# Patient Record
Sex: FEMALE | Race: WHITE | NOT HISPANIC OR LATINO | Employment: OTHER | URBAN - METROPOLITAN AREA
[De-identification: names, ages, dates, MRNs, and addresses within clinical notes are randomized per-mention and may not be internally consistent; named-entity substitution may affect disease eponyms.]

---

## 2017-10-09 ENCOUNTER — OFFICE VISIT (OUTPATIENT)
Dept: INTERNAL MEDICINE | Facility: CLINIC | Age: 42
End: 2017-10-09

## 2017-10-09 VITALS
OXYGEN SATURATION: 99 % | WEIGHT: 111.5 LBS | BODY MASS INDEX: 17.5 KG/M2 | HEIGHT: 67 IN | HEART RATE: 83 BPM | SYSTOLIC BLOOD PRESSURE: 110 MMHG | DIASTOLIC BLOOD PRESSURE: 78 MMHG

## 2017-10-09 DIAGNOSIS — Z30.41 ENCOUNTER FOR BIRTH CONTROL PILLS MAINTENANCE: Primary | ICD-10-CM

## 2017-10-09 PROCEDURE — 99202 OFFICE O/P NEW SF 15 MIN: CPT | Performed by: NURSE PRACTITIONER

## 2017-10-09 RX ORDER — NORGESTIMATE AND ETHINYL ESTRADIOL 0.25-0.035
1 KIT ORAL DAILY
Qty: 28 TABLET | Refills: 11 | Status: SHIPPED | OUTPATIENT
Start: 2017-10-09 | End: 2018-10-12 | Stop reason: SDUPTHER

## 2017-10-09 RX ORDER — NORGESTIMATE AND ETHINYL ESTRADIOL 0.25-0.035
1 KIT ORAL DAILY
COMMUNITY
End: 2017-10-09 | Stop reason: SDUPTHER

## 2017-10-09 NOTE — PROGRESS NOTES
"Chief Complaint   Patient presents with   • Establish Care       Margot Lamar is a 42 y.o. female presenting for refill on birth control pill; She was previously seen at [facility], but since her insurance changed, they no longer accept her insurance.  She needs to establish with PCP.  She had a Pap last year, which was normal; no abnormal Pap in past; no chronic medical problems; she states she has regular periods that last 5-6 days of moderate bleeding and denies pain with menses.  She has 2 children and has always remained on birth control between pregnancies; she has never had any issues taking OCPs, denies shortness of breath; pain in legs, chest.    No family hx of cancer    PFMSH    No past medical history on file.    No past surgical history on file.    Family History   Problem Relation Age of Onset   • Diabetes Sister    • Heart disease Maternal Grandfather        Social History     Social History   • Marital status: Unknown     Spouse name: N/A   • Number of children: N/A   • Years of education: N/A     Occupational History   • Not on file.     Social History Main Topics   • Smoking status: Never Smoker   • Smokeless tobacco: Not on file   • Alcohol use Yes      Comment: 2 drinks per month   • Drug use: No   • Sexual activity: Not on file     Other Topics Concern   • Not on file     Social History Narrative   • No narrative on file           Current Outpatient Prescriptions:   •  norgestimate-ethinyl estradiol (MONO-LINYAH) 0.25-35 MG-MCG per tablet, Take 1 tablet by mouth Daily., Disp: 28 tablet, Rfl: 11    Review of Systems   Constitutional: Negative for activity change, appetite change and fatigue.   Respiratory: Negative for shortness of breath.    Cardiovascular: Negative for leg swelling.   Neurological: Negative for headaches.   All other systems reviewed and are negative.      /78  Pulse 83  Ht 66.5\" (168.9 cm)  Wt 111 lb 8 oz (50.6 kg)  SpO2 99%  BMI 17.73 kg/m2    Physical " Exam   Constitutional: She is oriented to person, place, and time. She appears well-developed and well-nourished.   HENT:   Head: Normocephalic and atraumatic.   Eyes: Conjunctivae are normal.   Neck: Trachea normal and normal range of motion. Neck supple. No JVD present. No thyromegaly present.   Cardiovascular: Normal rate, regular rhythm, normal heart sounds and intact distal pulses.    No murmur heard.  Pulmonary/Chest: Effort normal and breath sounds normal.   Abdominal: Soft. Normal appearance and bowel sounds are normal. There is no tenderness.   Neurological: She is alert and oriented to person, place, and time.   Skin: Skin is warm, dry and intact.   Psychiatric: She has a normal mood and affect. Her speech is normal and behavior is normal.   Vitals reviewed.      ASSESSMENT/PLAN    1. Encounter for birth control pills maintenance  -Refill for birth control  - norgestimate-ethinyl estradiol (MONO-LINYAH) 0.25-35 MG-MCG per tablet; Take 1 tablet by mouth Daily.  Dispense: 28 tablet; Refill: 11  -needs Pap in 2019    F/U 1 year for physical exam with Pap    Plan of care reviewed with patient at the conclusion of today's visit. Education was provided in regards to diagnosis, management and any prescribed or recommended OTC medications.  Patient verbalizes Understanding of and agreement with management plan.        LUCINDA Pearson

## 2018-03-21 ENCOUNTER — OFFICE VISIT (OUTPATIENT)
Dept: INTERNAL MEDICINE | Facility: CLINIC | Age: 43
End: 2018-03-21

## 2018-03-21 VITALS
HEIGHT: 66 IN | OXYGEN SATURATION: 99 % | SYSTOLIC BLOOD PRESSURE: 118 MMHG | HEART RATE: 88 BPM | TEMPERATURE: 97.5 F | DIASTOLIC BLOOD PRESSURE: 64 MMHG | WEIGHT: 115 LBS | BODY MASS INDEX: 18.48 KG/M2

## 2018-03-21 DIAGNOSIS — R07.81 RIB PAIN ON LEFT SIDE: Primary | ICD-10-CM

## 2018-03-21 DIAGNOSIS — J22 LOWER RESPIRATORY INFECTION (E.G., BRONCHITIS, PNEUMONIA, PNEUMONITIS, PULMONITIS): ICD-10-CM

## 2018-03-21 PROCEDURE — 99214 OFFICE O/P EST MOD 30 MIN: CPT | Performed by: NURSE PRACTITIONER

## 2018-03-21 RX ORDER — PREDNISONE 20 MG/1
20 TABLET ORAL DAILY
Qty: 5 TABLET | Refills: 0 | Status: SHIPPED | OUTPATIENT
Start: 2018-03-21 | End: 2018-03-26

## 2018-03-21 RX ORDER — AZITHROMYCIN 250 MG/1
TABLET, FILM COATED ORAL
Qty: 6 TABLET | Refills: 0 | Status: SHIPPED | OUTPATIENT
Start: 2018-03-21 | End: 2018-10-12

## 2018-03-21 NOTE — PROGRESS NOTES
"  Chief Complaint   Patient presents with   • URI     Cough; muscle pain in stomach from coughing so much. Sx stared a few days ago.        HPI  Margot Lamar is a 42 y.o. female who presents with continuous persistent coughing from the resolved flu with fever and chest congestion last week. Sever  Cough to trigger pain in her left lower rib a few days ago. She is unable to drive, has decreased movemnt and ambulation due to pain.  Productive cough is uncharged in severity. OTC cough suppressants helping. No shortness of breath, more discomfort and pain that keeps her from moving. -history of cardiopulmonary processes, -calf pain, tenderness or swelling.  HPI     [facility]  The following portions of the patient's history were reviewed and updated as appropriate: allergies, current medications, past family history, past medical history, past social history, past surgical history and problem list.      Review of Systems   Constitutional: Positive for activity change. Negative for diaphoresis.   Respiratory: Positive for cough, chest tightness and shortness of breath.    Cardiovascular: Negative for chest pain, palpitations and leg swelling.   Gastrointestinal: Negative for nausea.   Neurological: Negative for dizziness, syncope and headaches.   Hematological: Negative for adenopathy.   Psychiatric/Behavioral: Positive for decreased concentration.   All other systems reviewed and are negative.          Objective   Blood pressure 118/64, pulse 88, temperature 97.5 °F (36.4 °C), temperature source Oral, height 167.6 cm (66\"), weight 52.2 kg (115 lb), SpO2 99 %.  Body mass index is 18.56 kg/m².      Physical Exam   Constitutional: She is oriented to person, place, and time. She appears well-developed and well-nourished. No distress.   HENT:   Head: Normocephalic and atraumatic.   Right Ear: Hearing and external ear normal.   Left Ear: Hearing and external ear normal.   Nose: Nose normal.   Mouth/Throat: Uvula is midline, " oropharynx is clear and moist and mucous membranes are normal.   Eyes: Conjunctivae and EOM are normal. Pupils are equal, round, and reactive to light.   Neck: Trachea normal and normal range of motion. Neck supple. No JVD present.   Without crepitus, pain, or contractures noted.   Cardiovascular: Normal rate, regular rhythm, S1 normal, S2 normal and normal heart sounds.    No murmur heard.  Pulmonary/Chest: Effort normal. No respiratory distress. She has no decreased breath sounds. She has rhonchi in the right upper field, the right middle field, the right lower field, the left upper field, the left middle field and the left lower field. She exhibits tenderness and bony tenderness. She exhibits no mass, no crepitus, no edema, no deformity and no retraction.       Patient sounds slightly winded when talking in sentences.    Abdominal: There is no hepatosplenomegaly.   Lymphadenopathy:        Head (left side): No occipital adenopathy present.     She has no cervical adenopathy.        Right: No supraclavicular adenopathy present.        Left: No supraclavicular adenopathy present.   Neurological: She is alert and oriented to person, place, and time.   Skin: Skin is warm and dry. Capillary refill takes 2 to 3 seconds. No cyanosis. No pallor.   Psychiatric: She has a normal mood and affect.   Nursing note and vitals reviewed.            ASSESSMENT/PLAN  Margot was seen today for uri.    Diagnoses and all orders for this visit:    Rib pain on left side  -     predniSONE (DELTASONE) 20 MG tablet; Take 1 tablet by mouth Daily for 5 days.  -     XR Chest PA Lateral With Both Oblique Projections (In Office); Future    Lower respiratory infection (e.g., bronchitis, pneumonia, pneumonitis, pulmonitis)  -     azithromycin (ZITHROMAX Z-MIMI) 250 MG tablet; Take 2 tablets the first day, then 1 tablet daily for 4 days.  -     XR Chest PA Lateral With Both Oblique Projections (In Office); Future             Discussed completion  of antibiotics as prescribed, use back up birth control for 1 month of cycle if birth control pills are used to prevent pregnancy.  Discussed splinting area of left lower rib when coughing for comfort.   Plan of care reviewed with patient at the conclusion of today's visit. Education was provided in regards to diagnosis, management and any prescribed or recommended OTC medications.  Patient verbalizes Understanding of and agreement with management plan.      FOLLOW-UP  Return if symptoms worsen or fail to improve, for Next scheduled follow up.      No future appointments.      Electronically signed by:  LUCINDA Guo  03/21/2018

## 2018-03-22 ENCOUNTER — HOSPITAL ENCOUNTER (OUTPATIENT)
Dept: GENERAL RADIOLOGY | Facility: HOSPITAL | Age: 43
Discharge: HOME | End: 2018-03-22
Admitting: NURSE PRACTITIONER

## 2018-03-22 ENCOUNTER — TELEPHONE (OUTPATIENT)
Dept: INTERNAL MEDICINE | Facility: CLINIC | Age: 43
End: 2018-03-22

## 2018-03-22 DIAGNOSIS — R07.81 RIB PAIN ON LEFT SIDE: ICD-10-CM

## 2018-03-22 DIAGNOSIS — J22 LOWER RESPIRATORY INFECTION (E.G., BRONCHITIS, PNEUMONIA, PNEUMONITIS, PULMONITIS): ICD-10-CM

## 2018-03-22 DIAGNOSIS — R07.81 RIB PAIN: Primary | ICD-10-CM

## 2018-03-22 PROCEDURE — 71046 X-RAY EXAM CHEST 2 VIEWS: CPT

## 2018-03-22 NOTE — PROGRESS NOTES
I reviewed this chest x-ray. Will you please let her know that it is normal and if her shortness of breath or pain gets worse to please go the ER. Thanks

## 2018-03-22 NOTE — TELEPHONE ENCOUNTER
RAVEN ORDERED AN XRAY FOR PT. HER COMPLAINT WAS RIB PAIN. THE XRAY ORDERED WAS FOR CHEST XRAY WITH BOTH OBLIQUES.   IF PT IS HAVING RIB PAIN IT NEEDS TO BE PUT IN AS CHEST PA LATERAL OR RIB PHS IN ORDER TO GET THE CORRECT RESULTS. THE PT JUST WANT TO M KATIE SURE SHE DOESN'T HAVE PNEUMONIA.   PT IS AT THE OFFICE RIGHT NOW

## 2018-03-23 ENCOUNTER — HOSPITAL ENCOUNTER (OUTPATIENT)
Dept: GENERAL RADIOLOGY | Facility: HOSPITAL | Age: 43
Discharge: HOME | End: 2018-03-23
Admitting: NURSE PRACTITIONER

## 2018-03-23 PROCEDURE — 71100 X-RAY EXAM RIBS UNI 2 VIEWS: CPT

## 2018-03-25 ENCOUNTER — TELEPHONE (OUTPATIENT)
Dept: INTERNAL MEDICINE | Facility: CLINIC | Age: 43
End: 2018-03-25

## 2018-03-25 NOTE — TELEPHONE ENCOUNTER
PATIENT WAS RETURNING YOUR CALL AND WOULD LIKE FOR YOU TO GIVE HER A CALL BACK IN REGARDS TO HER LABS. PATIENT'S CALL BACK NUMBER [phone]

## 2018-03-25 NOTE — TELEPHONE ENCOUNTER
----- Message from LUCINDA Theodore sent at 3/24/2018  2:50 PM EDT -----  No abnormality of ribs, no fracture or displacement;

## 2018-10-12 ENCOUNTER — OFFICE VISIT (OUTPATIENT)
Dept: INTERNAL MEDICINE | Facility: CLINIC | Age: 43
End: 2018-10-12

## 2018-10-12 VITALS
BODY MASS INDEX: 18.95 KG/M2 | DIASTOLIC BLOOD PRESSURE: 74 MMHG | SYSTOLIC BLOOD PRESSURE: 120 MMHG | HEART RATE: 68 BPM | WEIGHT: 117.4 LBS

## 2018-10-12 DIAGNOSIS — E55.9 VITAMIN D DEFICIENCY: ICD-10-CM

## 2018-10-12 DIAGNOSIS — Z00.00 PHYSICAL EXAM, ANNUAL: ICD-10-CM

## 2018-10-12 DIAGNOSIS — Z30.41 ENCOUNTER FOR BIRTH CONTROL PILLS MAINTENANCE: Primary | ICD-10-CM

## 2018-10-12 DIAGNOSIS — Z83.2 FAMILY HISTORY OF IRON DEFICIENCY: ICD-10-CM

## 2018-10-12 DIAGNOSIS — E78.1 HYPERTRIGLYCERIDEMIA, ESSENTIAL: ICD-10-CM

## 2018-10-12 DIAGNOSIS — R53.83 FATIGUE, UNSPECIFIED TYPE: ICD-10-CM

## 2018-10-12 LAB
25(OH)D3 SERPL-MCNC: 22.9 NG/ML
ALBUMIN SERPL-MCNC: 4.62 G/DL (ref 3.2–4.8)
ALBUMIN/GLOB SERPL: 1.7 G/DL (ref 1.5–2.5)
ALP SERPL-CCNC: 38 U/L (ref 25–100)
ALT SERPL W P-5'-P-CCNC: 16 U/L (ref 7–40)
ANION GAP SERPL CALCULATED.3IONS-SCNC: 11 MMOL/L (ref 3–11)
ARTICHOKE IGE QN: 130 MG/DL (ref 0–130)
AST SERPL-CCNC: 22 U/L (ref 0–33)
BASOPHILS # BLD AUTO: 0.03 10*3/MM3 (ref 0–0.2)
BASOPHILS NFR BLD AUTO: 0.3 % (ref 0–1)
BILIRUB SERPL-MCNC: 1.1 MG/DL (ref 0.3–1.2)
BUN BLD-MCNC: 14 MG/DL (ref 9–23)
BUN/CREAT SERPL: 17.7 (ref 7–25)
CALCIUM SPEC-SCNC: 9.1 MG/DL (ref 8.7–10.4)
CHLORIDE SERPL-SCNC: 102 MMOL/L (ref 99–109)
CHOLEST SERPL-MCNC: 207 MG/DL (ref 0–200)
CO2 SERPL-SCNC: 23 MMOL/L (ref 20–31)
CREAT BLD-MCNC: 0.79 MG/DL (ref 0.6–1.3)
DEPRECATED RDW RBC AUTO: 41.5 FL (ref 37–54)
EOSINOPHIL # BLD AUTO: 0.25 10*3/MM3 (ref 0–0.3)
EOSINOPHIL NFR BLD AUTO: 2.9 % (ref 0–3)
ERYTHROCYTE [DISTWIDTH] IN BLOOD BY AUTOMATED COUNT: 12.5 % (ref 11.3–14.5)
GFR SERPL CREATININE-BSD FRML MDRD: 79 ML/MIN/1.73
GLOBULIN UR ELPH-MCNC: 2.8 GM/DL
GLUCOSE BLD-MCNC: 70 MG/DL (ref 70–100)
HCT VFR BLD AUTO: 42.7 % (ref 34.5–44)
HDLC SERPL-MCNC: 74 MG/DL (ref 40–60)
HGB BLD-MCNC: 14.3 G/DL (ref 11.5–15.5)
IMM GRANULOCYTES # BLD: 0.02 10*3/MM3 (ref 0–0.03)
IMM GRANULOCYTES NFR BLD: 0.2 % (ref 0–0.6)
LYMPHOCYTES # BLD AUTO: 2.39 10*3/MM3 (ref 0.6–4.8)
LYMPHOCYTES NFR BLD AUTO: 27.6 % (ref 24–44)
MCH RBC QN AUTO: 30.5 PG (ref 27–31)
MCHC RBC AUTO-ENTMCNC: 33.5 G/DL (ref 32–36)
MCV RBC AUTO: 91 FL (ref 80–99)
MONOCYTES # BLD AUTO: 0.48 10*3/MM3 (ref 0–1)
MONOCYTES NFR BLD AUTO: 5.5 % (ref 0–12)
NEUTROPHILS # BLD AUTO: 5.5 10*3/MM3 (ref 1.5–8.3)
NEUTROPHILS NFR BLD AUTO: 63.7 % (ref 41–71)
PLATELET # BLD AUTO: 310 10*3/MM3 (ref 150–450)
PMV BLD AUTO: 10 FL (ref 6–12)
POTASSIUM BLD-SCNC: 4.5 MMOL/L (ref 3.5–5.5)
PROT SERPL-MCNC: 7.4 G/DL (ref 5.7–8.2)
RBC # BLD AUTO: 4.69 10*6/MM3 (ref 3.89–5.14)
SODIUM BLD-SCNC: 136 MMOL/L (ref 132–146)
TRIGL SERPL-MCNC: 96 MG/DL (ref 0–150)
TSH SERPL DL<=0.05 MIU/L-ACNC: 1.1 MIU/ML (ref 0.35–5.35)
VIT B12 BLD-MCNC: 638 PG/ML (ref 211–911)
WBC NRBC COR # BLD: 8.65 10*3/MM3 (ref 3.5–10.8)

## 2018-10-12 PROCEDURE — 82607 VITAMIN B-12: CPT | Performed by: NURSE PRACTITIONER

## 2018-10-12 PROCEDURE — 84443 ASSAY THYROID STIM HORMONE: CPT | Performed by: NURSE PRACTITIONER

## 2018-10-12 PROCEDURE — 82306 VITAMIN D 25 HYDROXY: CPT | Performed by: NURSE PRACTITIONER

## 2018-10-12 PROCEDURE — 85025 COMPLETE CBC W/AUTO DIFF WBC: CPT | Performed by: NURSE PRACTITIONER

## 2018-10-12 PROCEDURE — 80053 COMPREHEN METABOLIC PANEL: CPT | Performed by: NURSE PRACTITIONER

## 2018-10-12 PROCEDURE — 80061 LIPID PANEL: CPT | Performed by: NURSE PRACTITIONER

## 2018-10-12 PROCEDURE — 99396 PREV VISIT EST AGE 40-64: CPT | Performed by: NURSE PRACTITIONER

## 2018-10-12 RX ORDER — NORGESTIMATE AND ETHINYL ESTRADIOL 0.25-0.035
1 KIT ORAL DAILY
Qty: 28 TABLET | Refills: 11 | Status: SHIPPED | OUTPATIENT
Start: 2018-10-12 | End: 2019-10-07 | Stop reason: SDUPTHER

## 2018-10-12 RX ORDER — LANOLIN ALCOHOL/MO/W.PET/CERES
400 CREAM (GRAM) TOPICAL DAILY
COMMUNITY
End: 2022-10-28

## 2018-10-12 RX ORDER — INFLUENZA A VIRUS A/SINGAPORE/GP1908/2015 IVR-180A (H1N1) ANTIGEN (PROPIOLACTONE INACTIVATED), INFLUENZA A VIRUS A/SINGAPORE/INFIMH-16-0019/2016 IVR-186 (H3N2) ANTIGEN (PROPIOLACTONE INACTIVATED), INFLUENZA B VIRUS B/MARYLAND/15/2016 ANTIGEN (PROPIOLACTONE INACTIVATED), AND INFLUENZA B VIRUS B/PHUKET/3073/2013 BVR-1B ANTIGEN (PROPIOLACTONE INACTIVATED) 15; 15; 15; 15 UG/.5ML; UG/.5ML; UG/.5ML; UG/.5ML
INJECTION, SUSPENSION INTRAMUSCULAR
Refills: 0 | COMMUNITY
Start: 2018-09-06 | End: 2019-10-07

## 2018-10-12 NOTE — PROGRESS NOTES
Chief Complaint   Patient presents with   • Annual Exam     Physical    • Fatigue   • iron deficiency   • Contraception       HPI  Margot Lamar is a 43 y.o. female who presents for updated physical examination.    Needs refill of OCP--has been taking Mono-Lynyah for past few years w/o s/e; had an incident of elevated triglycerides a few years ago, but eats fairly healthy diet of mostly lean protein with occ red meat, low carbohydrate; she had iron deficiency with both pregnancies, reports no issues since; she does report having some increased fatigue, but assumes this is due to having 2 small children at home and working full-time    Family hx includes--vitamin d def (mom); Fe def anemia (sister); stroke, HTN (father--passed away d/t stroke); denies family hx of any cancer    Annual mammogram UTD (nml)--also performs self-breast checks monthly; last PAP (2017) nml; dental/vision exams UTD, wears seatbelts, sunscreen; flu vaccine in Sept at [facility]      Review of Systems   Denies headaches, visual changes, CP, palpitations, SOB, cough, abd pain, n/v/d, difficulty with urination, vaginal discharge, abnl vaginal bleeding, numbness/tingling, falls, mood changes, lightheadedness, rashes, joint sxs, hearing changes.    Pertinent items are noted in HPI      Vital Signs  /74   Pulse 68   Wt 53.3 kg (117 lb 6.4 oz)   BMI 18.95 kg/m²     Physical Exam:    General Appearance:    Alert, NAD   Head:    Normocephalic, without obvious abnormality, atraumatic   Eyes:    PERRL, EOMI   Ears:    Normal TM's and external ear canals bilaterally   Nose:   Nares normal, septum midline, mucosa normal, no drainage     or sinus tenderness   Throat:   Lips, mucosa, and tongue normal; oropharynx clear   Neck:   Supple, symmetrical FROM, no cervical lymphadenopathy   Thyroid:     No masses palpated, no thyromegaly, nontender   Lungs:     Clear to auscultation bilaterally, no respiratory distress        Heart:    Regular rate  and rhythm, S1 and S2 normal, no murmurs, rubs, or gallops   Breast Exam:  Done in 2017, mammogram done earlier this year   Abdomen:     Soft, bowel sounds present, non-tender, nondistended,  no masses, no hepatospenlomegaly   Genitalia:  Last done in 2017, no exam until 2020   Extremities: Extremities normal, no LE edema, no clubbing or cyanosis   Musculoskeletal:  All 4 extremities FROM; strength and sensation grossly intact   Gait:  Normal   Skin:   Skin color, texture, turgor normal, no rashes or lesions   Neurologic:   CNII-XII intact, normal strength, sensation and reflexes     throughout   Psychological: Mood stable     LABS--no prev labs available in chart    Assessment/Plan   1. Encounter for birth control pills maintenance  - norgestimate-ethinyl estradiol (MONO-LINYAH) 0.25-35 MG-MCG per tablet; Take 1 tablet by mouth Daily.  Dispense: 28 tablet; Refill: 11    2. Physical exam, annual    3. Hypertriglyceridemia, essential  - Lipid Panel    4. Vitamin D deficiency  - Vitamin D 25 Hydroxy    5. Family history of iron deficiency    6. Fatigue, unspecified type  - CBC & Differential  - Comprehensive metabolic panel  - Vitamin B12  - TSH    Plan: will cont current method of OCP (no family hx of breast/uterine/cervical cancer; labs to r/o iron, vit D, vit B12 deficiency--will rx/recommend supplements according to results; updated cholesterol levels; recommend heart healthy diet with low trans/sat fats, portion control, maintain hydration, low sugar/carb/starches; cont regular exercise regimen    Return in about 1 year (around 10/12/2019), or if symptoms worsen or fail to improve, for Annual.    May f/u sooner for new problem or illness    Future Appointments  Date Time Provider Department Center   10/16/2019 8:30 AM Madeline Sosa APRN [facility] None     LUCINDA Pearson  10/12/2018

## 2019-09-28 DIAGNOSIS — Z30.41 ENCOUNTER FOR BIRTH CONTROL PILLS MAINTENANCE: ICD-10-CM

## 2019-09-28 RX ORDER — NORGESTIMATE AND ETHINYL ESTRADIOL 0.25-0.035
KIT ORAL
Qty: 28 TABLET | Refills: 11 | OUTPATIENT
Start: 2019-09-28

## 2019-10-03 DIAGNOSIS — Z30.41 ENCOUNTER FOR BIRTH CONTROL PILLS MAINTENANCE: ICD-10-CM

## 2019-10-03 RX ORDER — NORGESTIMATE AND ETHINYL ESTRADIOL 0.25-0.035
KIT ORAL
Qty: 28 TABLET | Refills: 11 | OUTPATIENT
Start: 2019-10-03

## 2019-10-07 ENCOUNTER — OFFICE VISIT (OUTPATIENT)
Dept: INTERNAL MEDICINE | Facility: CLINIC | Age: 44
End: 2019-10-07

## 2019-10-07 VITALS
DIASTOLIC BLOOD PRESSURE: 78 MMHG | BODY MASS INDEX: 20.09 KG/M2 | HEART RATE: 73 BPM | OXYGEN SATURATION: 98 % | WEIGHT: 125 LBS | HEIGHT: 66 IN | TEMPERATURE: 97.6 F | SYSTOLIC BLOOD PRESSURE: 112 MMHG

## 2019-10-07 DIAGNOSIS — Z30.41 ENCOUNTER FOR BIRTH CONTROL PILLS MAINTENANCE: ICD-10-CM

## 2019-10-07 PROCEDURE — 99213 OFFICE O/P EST LOW 20 MIN: CPT | Performed by: NURSE PRACTITIONER

## 2019-10-07 RX ORDER — NORGESTIMATE AND ETHINYL ESTRADIOL 0.25-0.035
1 KIT ORAL DAILY
Qty: 28 TABLET | Refills: 11 | Status: SHIPPED | OUTPATIENT
Start: 2019-10-07 | End: 2020-08-31

## 2019-10-07 NOTE — PROGRESS NOTES
Chief Complaint   Patient presents with   • Contraception     refill on OCP       History of Present Illness    Margot Lamar is a 44 y.o. female who presents today for medication refill.  Requests refill of OCP - taking Williams Linyah for 16 years without side effect. Taking medication daily, not missing doses. Has 2 children, 11 and 8 yo boys. Does not desire to have more children. Last pap 2017- normal. Non smoker, no personal history of blood clots, no personal or family history of breast or cervical cancer. Denies headaches.    PMSFH    The following portions of the patient's history were reviewed and updated as appropriate: allergies, current medications, past family history, past medical history, past social history, past surgical history and problem list.     Social History     Tobacco Use   • Smoking status: Never Smoker   • Smokeless tobacco: Never Used   Substance Use Topics   • Alcohol use: Yes     Comment: 2 drinks per month       History reviewed. No pertinent past medical history.    History reviewed. No pertinent surgical history.    Family History   Problem Relation Age of Onset   • Diabetes Sister    • Heart disease Maternal Grandfather        Allergies   Allergen Reactions   • Penicillins Angioedema         Current Outpatient Medications:   •  folic acid (FOLVITE) 400 MCG tablet, Take 400 mcg by mouth Daily., Disp: , Rfl:   •  norgestimate-ethinyl estradiol (MONO-LINYAH) 0.25-35 MG-MCG per tablet, Take 1 tablet by mouth Daily., Disp: 28 tablet, Rfl: 11    Review of Systems  Review of Systems   Constitutional: Negative for chills, diaphoresis, fatigue, fever and unexpected weight change.   Respiratory: Negative for cough and shortness of breath.    Cardiovascular: Negative for chest pain, palpitations and leg swelling.   Gastrointestinal: Negative for abdominal distention, abdominal pain, constipation, diarrhea, nausea and vomiting.   Musculoskeletal: Negative for myalgias.   Neurological:  "Negative for dizziness, light-headedness and headaches.   Psychiatric/Behavioral: Negative for dysphoric mood and sleep disturbance. The patient is not nervous/anxious.        Vitals:  Vitals:    10/07/19 1131   BP: 112/78   Pulse: 73   Temp: 97.6 °F (36.4 °C)   TempSrc: Oral   SpO2: 98%   Weight: 56.7 kg (125 lb)   Height: 167.6 cm (65.98\")       Physical Exam  Physical Exam   Constitutional: She is oriented to person, place, and time. Vital signs are normal. She appears well-developed and well-nourished.   Cardiovascular: Normal rate, regular rhythm and normal heart sounds.   Pulmonary/Chest: Effort normal and breath sounds normal. No respiratory distress. She has no decreased breath sounds. She has no wheezes.   Neurological: She is alert and oriented to person, place, and time.   Skin: Skin is warm and dry.   Psychiatric: She has a normal mood and affect. Her behavior is normal.   Nursing note and vitals reviewed.      Labs  None this visit    Assessment/Plan  Margot was seen today for contraception.    Diagnoses and all orders for this visit:    Encounter for birth control pills maintenance  -     norgestimate-ethinyl estradiol (MONO-LINYAH) 0.25-35 MG-MCG per tablet; Take 1 tablet by mouth Daily.    Patient tolerating OCP well, refilled as previously prescribed. I have reviewed risks/benefits and potential side effects of various hormonal and non-hormonal contraception options.  Patient voiced understanding. Patient to return in 1 week for scheduled annual physical exam; breast exam, screening lab work, and health maintenance to be reviewed at that time.    Plan of care reviewed with patient at conclusion of today's visit. Patient education was provided regarding diagnosis, management, and prescribed or recommended OTC medications. Patient was informed to notify office of any new, worsening, or persistent symptoms. Patient verbalized understanding and agreement with plan of care.     Follow-Up  Return in " about 1 week (around 10/14/2019) for Annual.    Electronically Signed By:  LUCINDA Dumont

## 2019-10-16 ENCOUNTER — TRANSCRIBE ORDERS (OUTPATIENT)
Dept: INTERNAL MEDICINE | Facility: CLINIC | Age: 44
End: 2019-10-16

## 2019-10-16 ENCOUNTER — OFFICE VISIT (OUTPATIENT)
Dept: INTERNAL MEDICINE | Facility: CLINIC | Age: 44
End: 2019-10-16

## 2019-10-16 VITALS
SYSTOLIC BLOOD PRESSURE: 104 MMHG | TEMPERATURE: 97.7 F | HEIGHT: 66 IN | HEART RATE: 74 BPM | WEIGHT: 127 LBS | BODY MASS INDEX: 20.41 KG/M2 | OXYGEN SATURATION: 98 % | DIASTOLIC BLOOD PRESSURE: 66 MMHG

## 2019-10-16 DIAGNOSIS — Z12.31 VISIT FOR SCREENING MAMMOGRAM: Primary | ICD-10-CM

## 2019-10-16 DIAGNOSIS — Z00.00 ANNUAL PHYSICAL EXAM: Primary | ICD-10-CM

## 2019-10-16 LAB
25(OH)D3 SERPL-MCNC: 33.9 NG/ML (ref 30–100)
ANION GAP SERPL CALCULATED.3IONS-SCNC: 12.1 MMOL/L (ref 5–15)
BUN BLD-MCNC: 12 MG/DL (ref 6–20)
BUN/CREAT SERPL: 15.8 (ref 7–25)
CALCIUM SPEC-SCNC: 8.8 MG/DL (ref 8.6–10.5)
CHLORIDE SERPL-SCNC: 98 MMOL/L (ref 98–107)
CHOLEST SERPL-MCNC: 174 MG/DL (ref 0–200)
CO2 SERPL-SCNC: 24.9 MMOL/L (ref 22–29)
CREAT BLD-MCNC: 0.76 MG/DL (ref 0.57–1)
DEPRECATED RDW RBC AUTO: 40.3 FL (ref 37–54)
ERYTHROCYTE [DISTWIDTH] IN BLOOD BY AUTOMATED COUNT: 12 % (ref 12.3–15.4)
GFR SERPL CREATININE-BSD FRML MDRD: 83 ML/MIN/1.73
GLUCOSE BLD-MCNC: 81 MG/DL (ref 65–99)
HCT VFR BLD AUTO: 39.4 % (ref 34–46.6)
HDLC SERPL-MCNC: 61 MG/DL (ref 40–60)
HGB BLD-MCNC: 13.3 G/DL (ref 12–15.9)
LDLC SERPL CALC-MCNC: 98 MG/DL (ref 0–100)
LDLC/HDLC SERPL: 1.61 {RATIO}
MCH RBC QN AUTO: 30.6 PG (ref 26.6–33)
MCHC RBC AUTO-ENTMCNC: 33.8 G/DL (ref 31.5–35.7)
MCV RBC AUTO: 90.6 FL (ref 79–97)
PLATELET # BLD AUTO: 293 10*3/MM3 (ref 140–450)
PMV BLD AUTO: 10.4 FL (ref 6–12)
POTASSIUM BLD-SCNC: 3.9 MMOL/L (ref 3.5–5.2)
RBC # BLD AUTO: 4.35 10*6/MM3 (ref 3.77–5.28)
SODIUM BLD-SCNC: 135 MMOL/L (ref 136–145)
TRIGL SERPL-MCNC: 74 MG/DL (ref 0–150)
VLDLC SERPL-MCNC: 14.8 MG/DL (ref 5–40)
WBC NRBC COR # BLD: 6.65 10*3/MM3 (ref 3.4–10.8)

## 2019-10-16 PROCEDURE — 80061 LIPID PANEL: CPT | Performed by: NURSE PRACTITIONER

## 2019-10-16 PROCEDURE — 82306 VITAMIN D 25 HYDROXY: CPT | Performed by: NURSE PRACTITIONER

## 2019-10-16 PROCEDURE — 80048 BASIC METABOLIC PNL TOTAL CA: CPT | Performed by: NURSE PRACTITIONER

## 2019-10-16 PROCEDURE — 85027 COMPLETE CBC AUTOMATED: CPT | Performed by: NURSE PRACTITIONER

## 2019-10-16 PROCEDURE — 99396 PREV VISIT EST AGE 40-64: CPT | Performed by: NURSE PRACTITIONER

## 2019-10-16 NOTE — PROGRESS NOTES
"Chief Complaint   Patient presents with   • Annual Exam       History of Present Illness    Margot Lamar is a 44 y.o. female who presents today for annual physical exam.    Taking OTC Fish oil, folic acid, and vitamin D and prescribe OCPs.    The patient is being seen for a health maintenance evaluation.  The last health maintenance was 1 year(s) ago.    Reported Health  Good Yes    Social History  Margot  does not smoke cigarettes.   She drinks occasional alcohol, socially.  She does not use illicit drugs.    General History  Margot  does have regular dental visits. Goes every 6 months.  She does not complain of vision problems. Wears contacts. Last eye exam was Jan 2019.  Immunizations are up to date. The patient needs the following immunizations: none    Lifestyle  Margot  consumes in general, a \"healthy\" diet  .  She exercises intermittently.    Reproductive Health  Margot  is premenopausal.  She reports periods are regular every 28-30 days.  She is sexually active. Her contraceptive plan is oral contraceptives (estrogen/progesterone).    Screening  Last pap was 2017. History of abnormal pap smear or family history of gyn cancer: none  Last mammogram was [id number], normal . Personal or family history of abnormal mammograms or breast cancer: none      [facility]    The following portions of the patient's history were reviewed and updated as appropriate: allergies, current medications, past family history, past medical history, past social history, past surgical history and problem list.     Social History     Tobacco Use   • Smoking status: Never Smoker   • Smokeless tobacco: Never Used   Substance Use Topics   • Alcohol use: Yes     Comment: 2 drinks per month       History reviewed. No pertinent past medical history.    History reviewed. No pertinent surgical history.    Family History   Problem Relation Age of Onset   • Diabetes Sister    • Heart disease Maternal Grandfather        Allergies " "  Allergen Reactions   • Penicillins Angioedema         Current Outpatient Medications:   •  folic acid (FOLVITE) 400 MCG tablet, Take 400 mcg by mouth Daily., Disp: , Rfl:   •  norgestimate-ethinyl estradiol (MONO-LINYAH) 0.25-35 MG-MCG per tablet, Take 1 tablet by mouth Daily., Disp: 28 tablet, Rfl: 11    Review of Systems  Review of Systems   Constitutional: Negative for chills, diaphoresis, fatigue and fever.   HENT: Positive for postnasal drip. Negative for congestion, ear pain, rhinorrhea, sinus pressure, sinus pain and sore throat.    Eyes: Negative for pain, discharge, redness, itching and visual disturbance.   Respiratory: Positive for cough. Negative for chest tightness, shortness of breath and wheezing.    Cardiovascular: Negative for chest pain, palpitations and leg swelling.   Gastrointestinal: Negative for abdominal pain, blood in stool, constipation, diarrhea, nausea and vomiting.   Endocrine: Negative for cold intolerance, heat intolerance, polydipsia, polyphagia and polyuria.   Genitourinary: Negative for dysuria and hematuria.   Musculoskeletal: Negative for arthralgias and back pain.   Skin: Negative for color change, rash and wound.   Allergic/Immunologic: Negative for environmental allergies and food allergies.   Neurological: Negative for dizziness, weakness, light-headedness, numbness and headaches.   Hematological: Does not bruise/bleed easily.   Psychiatric/Behavioral: Negative for confusion, dysphoric mood and sleep disturbance. The patient is not nervous/anxious.        Vitals:  Vitals:    10/16/19 0754   BP: 104/66   Pulse: 74   Temp: 97.7 °F (36.5 °C)   TempSrc: Oral   SpO2: 98%   Weight: 57.6 kg (127 lb)   Height: 167.6 cm (65.98\")   PainSc: 0-No pain       Physical Exam  Physical Exam   Constitutional: She is oriented to person, place, and time. Vital signs are normal. She appears well-developed and well-nourished. No distress.   HENT:   Head: Normocephalic and atraumatic.   Right Ear: " Tympanic membrane, external ear and ear canal normal.   Left Ear: Tympanic membrane, external ear and ear canal normal.   Nose: Nose normal. No mucosal edema, rhinorrhea, sinus tenderness or congestion. Right sinus exhibits no maxillary sinus tenderness and no frontal sinus tenderness. Left sinus exhibits no maxillary sinus tenderness and no frontal sinus tenderness.   Mouth/Throat: Uvula is midline, oropharynx is clear and moist and mucous membranes are normal. No tonsillar exudate.   Eyes: Conjunctivae, EOM and lids are normal. Pupils are equal, round, and reactive to light.   Neck: Trachea normal, normal range of motion and phonation normal. Neck supple. Carotid bruit is not present. No tracheal deviation present. No thyroid mass and no thyromegaly present.   Cardiovascular: Normal rate, regular rhythm, normal heart sounds and intact distal pulses.   No murmur heard.  Pulses:       Radial pulses are 2+ on the right side, and 2+ on the left side.        Dorsalis pedis pulses are 2+ on the right side, and 2+ on the left side.   No edema   Pulmonary/Chest: Effort normal and breath sounds normal. No respiratory distress. She has no decreased breath sounds. She has no wheezes. She exhibits no tenderness.   Abdominal: Soft. Normal appearance and bowel sounds are normal. She exhibits no distension and no mass. There is no hepatosplenomegaly. There is no tenderness. There is no rebound and no guarding. No hernia.   Musculoskeletal: Normal range of motion. She exhibits no edema, tenderness or deformity.   Lymphadenopathy:        Head (right side): No submental, no submandibular, no tonsillar, no preauricular, no posterior auricular and no occipital adenopathy present.        Head (left side): No submental, no submandibular, no tonsillar, no preauricular, no posterior auricular and no occipital adenopathy present.     She has no cervical adenopathy.   Neurological: She is alert and oriented to person, place, and time. No  cranial nerve deficit or sensory deficit. She exhibits normal muscle tone. Coordination and gait normal.   Skin: Skin is warm, dry and intact. Capillary refill takes 2 to 3 seconds. No rash noted.   Psychiatric: She has a normal mood and affect. Her speech is normal and behavior is normal. Judgment and thought content normal. Cognition and memory are normal.   Nursing note and vitals reviewed.      Labs  Labs ordered today: CBC, BMP, Lipid panel, Vit D    Assessment/Plan  Margot was seen today for annual exam.    Diagnoses and all orders for this visit:    Annual physical exam  -     Mammo Screening Digital Tomosynthesis Bilateral With CAD  -     CBC (No Diff)  -     Basic metabolic panel  -     Lipid Panel  -     Vitamin D 25 hydroxy    The patient is here for an annual health maintenance visit. Pap and gyn exams are deffered and are UTD. Currently, the patient consumes a healthy diet and has an adequate exercise regimen. Screening lab work is ordered, will review and notify patient of results when received. Immunizations given today for health maintenance include: none.  Discussed with and counseled patient regarding nutrition, aerobic exercise, routine dental evaluations, routine eye exams, reproductive health, cardiovascular risk reduction, sunscreen use, self skin examination (annual dermatology evaluations), safe relationships, smoke detectors in home, and seat belt use (general overall safety).  Further recommendations after lab evaluation.  Annual wellness evaluations recommended.     Plan of care reviewed with patient at conclusion of today's visit. Patient education was provided regarding diagnosis, management, and prescribed or recommended OTC medications. Patient was informed to notify office of any new, worsening, or persistent symptoms. Patient verbalized understanding and agreement with plan of care.     Follow-Up  Return in about 1 year (around 10/16/2020) for Annual.    Electronically Signed  By:  LUCINDA Dumont

## 2019-10-16 NOTE — PATIENT INSTRUCTIONS
Preventive Care 40-64 Years, Female  Preventive care refers to lifestyle choices and visits with your health care provider that can promote health and wellness.  What does preventive care include?    · A yearly physical exam. This is also called an annual well check.  · Dental exams once or twice a year.  · Routine eye exams. Ask your health care provider how often you should have your eyes checked.  · Personal lifestyle choices, including:  ? Daily care of your teeth and gums.  ? Regular physical activity.  ? Eating a healthy diet.  ? Avoiding tobacco and drug use.  ? Limiting alcohol use.  ? Practicing safe sex.  ? Taking low-dose aspirin daily starting at age 50.  ? Taking vitamin and mineral supplements as recommended by your health care provider.  What happens during an annual well check?  The services and screenings done by your health care provider during your annual well check will depend on your age, overall health, lifestyle risk factors, and family history of disease.  Counseling  Your health care provider may ask you questions about your:  · Alcohol use.  · Tobacco use.  · Drug use.  · Emotional well-being.  · Home and relationship well-being.  · Sexual activity.  · Eating habits.  · Work and work environment.  · Method of birth control.  · Menstrual cycle.  · Pregnancy history.  Screening  You may have the following tests or measurements:  · Height, weight, and BMI.  · Blood pressure.  · Lipid and cholesterol levels. These may be checked every 5 years, or more frequently if you are over 50 years old.  · Skin check.  · Lung cancer screening. You may have this screening every year starting at age 55 if you have a 30-pack-year history of smoking and currently smoke or have quit within the past 15 years.  · Colorectal cancer screening. All adults should have this screening starting at age 50 and continuing until age 75. Your health care provider may recommend screening at age 45. You will have tests every  1-10 years, depending on your results and the type of screening test. People at increased risk should start screening at an earlier age. Screening tests may include:  ? Guaiac-based fecal occult blood testing.  ? Fecal immunochemical test (FIT).  ? Stool DNA test.  ? Virtual colonoscopy.  ? Sigmoidoscopy. During this test, a flexible tube with a tiny camera (sigmoidoscope) is used to examine your rectum and lower colon. The sigmoidoscope is inserted through your anus into your rectum and lower colon.  ? Colonoscopy. During this test, a long, thin, flexible tube with a tiny camera (colonoscope) is used to examine your entire colon and rectum.  · Hepatitis C blood test.  · Hepatitis B blood test.  · Sexually transmitted disease (STD) testing.  · Diabetes screening. This is done by checking your blood sugar (glucose) after you have not eaten for a while (fasting). You may have this done every 1-3 years.  · Mammogram. This may be done every 1-2 years. Talk to your health care provider about when you should start having regular mammograms. This may depend on whether you have a family history of breast cancer.  · BRCA-related cancer screening. This may be done if you have a family history of breast, ovarian, tubal, or peritoneal cancers.  · Pelvic exam and Pap test. This may be done every 3 years starting at age 21. Starting at age 30, this may be done every 5 years if you have a Pap test in combination with an HPV test.  · Bone density scan. This is done to screen for osteoporosis. You may have this scan if you are at high risk for osteoporosis.  Discuss your test results, treatment options, and if necessary, the need for more tests with your health care provider.  Vaccines  Your health care provider may recommend certain vaccines, such as:  · Influenza vaccine. This is recommended every year.  · Tetanus, diphtheria, and acellular pertussis (Tdap, Td) vaccine. You may need a Td booster every 10 years.  · Varicella  vaccine. You may need this if you have not been vaccinated.  · Zoster vaccine. You may need this after age 60.  · Measles, mumps, and rubella (MMR) vaccine. You may need at least one dose of MMR if you were born in 1957 or later. You may also need a second dose.  · Pneumococcal 13-valent conjugate (PCV13) vaccine. You may need this if you have certain conditions and were not previously vaccinated.  · Pneumococcal polysaccharide (PPSV23) vaccine. You may need one or two doses if you smoke cigarettes or if you have certain conditions.  · Meningococcal vaccine. You may need this if you have certain conditions.  · Hepatitis A vaccine. You may need this if you have certain conditions or if you travel or work in places where you may be exposed to hepatitis A.  · Hepatitis B vaccine. You may need this if you have certain conditions or if you travel or work in places where you may be exposed to hepatitis B.  · Haemophilus influenzae type b (Hib) vaccine. You may need this if you have certain conditions.  Talk to your health care provider about which screenings and vaccines you need and how often you need them.  This information is not intended to replace advice given to you by your health care provider. Make sure you discuss any questions you have with your health care provider.  Document Released: 01/13/2017 Document Revised: 02/07/2019 Document Reviewed: 10/18/2016  [employer] Interactive Patient Education © 2019 [employer]

## 2020-01-03 ENCOUNTER — APPOINTMENT (OUTPATIENT)
Dept: OTHER | Facility: HOSPITAL | Age: 45
End: 2020-01-03

## 2020-01-03 ENCOUNTER — HOSPITAL ENCOUNTER (OUTPATIENT)
Dept: MAMMOGRAPHY | Facility: HOSPITAL | Age: 45
Discharge: HOME | End: 2020-01-03
Admitting: NURSE PRACTITIONER

## 2020-01-03 DIAGNOSIS — Z12.31 VISIT FOR SCREENING MAMMOGRAM: ICD-10-CM

## 2020-01-03 PROCEDURE — 77067 SCR MAMMO BI INCL CAD: CPT | Performed by: RADIOLOGY

## 2020-01-03 PROCEDURE — 77067 SCR MAMMO BI INCL CAD: CPT

## 2020-01-03 PROCEDURE — 77063 BREAST TOMOSYNTHESIS BI: CPT

## 2020-01-03 PROCEDURE — 77063 BREAST TOMOSYNTHESIS BI: CPT | Performed by: RADIOLOGY

## 2020-08-31 DIAGNOSIS — Z30.41 ENCOUNTER FOR BIRTH CONTROL PILLS MAINTENANCE: ICD-10-CM

## 2020-10-20 ENCOUNTER — LAB (OUTPATIENT)
Dept: LAB | Facility: HOSPITAL | Age: 45
End: 2020-10-20

## 2020-10-20 ENCOUNTER — OFFICE VISIT (OUTPATIENT)
Dept: INTERNAL MEDICINE | Facility: CLINIC | Age: 45
End: 2020-10-20

## 2020-10-20 ENCOUNTER — RESULTS ENCOUNTER (OUTPATIENT)
Dept: INTERNAL MEDICINE | Facility: CLINIC | Age: 45
End: 2020-10-20

## 2020-10-20 VITALS
OXYGEN SATURATION: 99 % | HEART RATE: 68 BPM | DIASTOLIC BLOOD PRESSURE: 78 MMHG | SYSTOLIC BLOOD PRESSURE: 110 MMHG | HEIGHT: 67 IN | TEMPERATURE: 97.7 F | BODY MASS INDEX: 20.72 KG/M2 | WEIGHT: 132 LBS

## 2020-10-20 DIAGNOSIS — Z12.11 ENCOUNTER FOR SCREENING FOR MALIGNANT NEOPLASM OF COLON: ICD-10-CM

## 2020-10-20 DIAGNOSIS — Z23 NEED FOR VACCINATION: ICD-10-CM

## 2020-10-20 DIAGNOSIS — Z30.41 ENCOUNTER FOR BIRTH CONTROL PILLS MAINTENANCE: ICD-10-CM

## 2020-10-20 DIAGNOSIS — Z00.00 ANNUAL PHYSICAL EXAM: ICD-10-CM

## 2020-10-20 DIAGNOSIS — Z00.00 ANNUAL PHYSICAL EXAM: Primary | ICD-10-CM

## 2020-10-20 LAB
DEPRECATED RDW RBC AUTO: 43 FL (ref 37–54)
ERYTHROCYTE [DISTWIDTH] IN BLOOD BY AUTOMATED COUNT: 13.1 % (ref 12.3–15.4)
HCT VFR BLD AUTO: 42.4 % (ref 34–46.6)
HGB BLD-MCNC: 14.5 G/DL (ref 12–15.9)
MCH RBC QN AUTO: 30.9 PG (ref 26.6–33)
MCHC RBC AUTO-ENTMCNC: 34.2 G/DL (ref 31.5–35.7)
MCV RBC AUTO: 90.4 FL (ref 79–97)
PLATELET # BLD AUTO: 341 10*3/MM3 (ref 140–450)
PMV BLD AUTO: 10.3 FL (ref 6–12)
RBC # BLD AUTO: 4.69 10*6/MM3 (ref 3.77–5.28)
WBC # BLD AUTO: 8.73 10*3/MM3 (ref 3.4–10.8)

## 2020-10-20 PROCEDURE — 84443 ASSAY THYROID STIM HORMONE: CPT | Performed by: NURSE PRACTITIONER

## 2020-10-20 PROCEDURE — 90715 TDAP VACCINE 7 YRS/> IM: CPT | Performed by: NURSE PRACTITIONER

## 2020-10-20 PROCEDURE — 84439 ASSAY OF FREE THYROXINE: CPT | Performed by: NURSE PRACTITIONER

## 2020-10-20 PROCEDURE — 99396 PREV VISIT EST AGE 40-64: CPT | Performed by: NURSE PRACTITIONER

## 2020-10-20 PROCEDURE — 80061 LIPID PANEL: CPT | Performed by: NURSE PRACTITIONER

## 2020-10-20 PROCEDURE — 85027 COMPLETE CBC AUTOMATED: CPT | Performed by: NURSE PRACTITIONER

## 2020-10-20 PROCEDURE — 90471 IMMUNIZATION ADMIN: CPT | Performed by: NURSE PRACTITIONER

## 2020-10-20 PROCEDURE — 80053 COMPREHEN METABOLIC PANEL: CPT | Performed by: NURSE PRACTITIONER

## 2020-10-20 PROCEDURE — 36415 COLL VENOUS BLD VENIPUNCTURE: CPT

## 2020-10-20 RX ORDER — NORGESTIMATE AND ETHINYL ESTRADIOL 0.25-0.035
1 KIT ORAL DAILY
Qty: 84 TABLET | Refills: 3 | Status: SHIPPED | OUTPATIENT
Start: 2020-10-20 | End: 2021-09-24

## 2020-10-20 NOTE — PROGRESS NOTES
"Chief Complaint   Patient presents with   • Annual Exam     No Pap, pt is fasting       History of Present Illness    Margot Lamar is a 45 y.o. female who presents today for an annual physical exam.     Taking OCP daily, does not forget doses and denies adverse side effects. Does not desire to have additional children. Last pap 2018 - normal. Non-smoker, no hisotry of heart disease, HTN migraines with aura, DVTs. no personal or family history of breast or cervical cancer. She is originally from [place] where her family continues to reside. She lives with her , who is originally from [place], in [place] with their 2 children ages 12 and 10. Does not desire to have additional children.    The patient is being seen for a health maintenance evaluation.  The last health maintenance was 1 year(s) ago.    Reported Health  Good:  Yes    Social History  Margot does not smoke cigarettes.   She drinks occasional alcohol. 1 Drink or less per week.  She does not use illicit drugs.    General History  Margot  does have regular dental visits. Generally goes every 6 months.  She does complain of vision problems. Wears contacts and glasses. Last eye exam was about 1 year ago (Aug 2019).  Immunizations are not up to date. The patient needs the following immunizations: Tdap    Lifestyle  Margot  consumes in general, a \"healthy\" diet  . Lots of fresh produce, lean meats mostly. Avoids fried foods and take out foods.  She exercises twice weekly going to gym with 30 min on elliptical; yard work once weekly, running 2-3 times per week. Doing stairs 6 days per week.  Healthy Diet: Yes  Weight Concerns: No    Reproductive Health  Margot  is premenopausal.  She reports periods are regular every 28-30 days.  She is sexually active. Her contraceptive plan is oral contraceptives (estrogen/progesterone).    Screening  Last pap was 2018, normal. History of abnormal pap smear or family history of gyn cancer: " none  Last mammogram was Jan 2020. Personal or family history of abnormal mammograms or breast cancer: none  Last colonoscopy was N/A. Family history of colon cancer: none  Last DEXA was N/A.   Cancer Screening: Yes  Metabolic Screening: Yes  Risk Screening: Yes    Patient denies fever, chills, headache, ear pain, sore throat, shortness of air, cough, wheezing, chest pain, palpitations, abdominal pain, nausea, vomiting, diarrhea, dysuria, blood in stool or urine. Mood is stable. Eating and drinking as usual. No unexplained weight loss or gain.       Review of Systems  Review of Systems   Constitutional: Negative for appetite change, chills, diaphoresis, fatigue and fever.   HENT: Negative for congestion, ear pain, postnasal drip, rhinorrhea, sinus pressure, sinus pain and sore throat.    Eyes: Negative for pain, discharge, redness, itching and visual disturbance.   Respiratory: Negative for cough, chest tightness, shortness of breath and wheezing.    Cardiovascular: Negative for chest pain, palpitations and leg swelling.   Gastrointestinal: Negative for abdominal pain, blood in stool, constipation, diarrhea, nausea and vomiting.   Endocrine: Negative for cold intolerance, heat intolerance, polydipsia, polyphagia and polyuria.   Genitourinary: Negative for dysuria and hematuria.   Musculoskeletal: Negative for arthralgias, back pain and myalgias.   Skin: Negative for color change, rash and wound.   Allergic/Immunologic: Negative for environmental allergies and food allergies.   Neurological: Negative for dizziness, weakness, light-headedness, numbness and headaches.   Hematological: Does not bruise/bleed easily.   Psychiatric/Behavioral: Negative for confusion, dysphoric mood and sleep disturbance. The patient is not nervous/anxious.        [facility]    The following portions of the patient's history were reviewed and updated as appropriate: allergies, current medications, past family history, past medical history, past  "social history, past surgical history and problem list.     Social History     Tobacco Use   • Smoking status: Never Smoker   • Smokeless tobacco: Never Used   Substance Use Topics   • Alcohol use: Yes     Comment: 2 drinks per month       History reviewed. No pertinent past medical history.    History reviewed. No pertinent surgical history.    Family History   Problem Relation Age of Onset   • Diabetes Sister    • Heart disease Maternal Grandfather    • Breast cancer Neg Hx    • Ovarian cancer Neg Hx        Allergies   Allergen Reactions   • Penicillins Angioedema         Current Outpatient Medications:   •  folic acid (FOLVITE) 400 MCG tablet, Take 400 mcg by mouth Daily., Disp: , Rfl:   •  norgestimate-ethinyl estradiol (Sprintec 28) 0.25-35 MG-MCG per tablet, Take 1 tablet by mouth Daily., Disp: 84 tablet, Rfl: 3    Vitals:  Vitals:    10/20/20 0841   BP: 110/78   Pulse: 68   Temp: 97.7 °F (36.5 °C)   SpO2: 99%   Weight: 59.9 kg (132 lb)   Height: 168.9 cm (66.5\")   PainSc: 0-No pain       Physical Exam  Physical Exam  Vitals signs and nursing note reviewed.   Constitutional:       General: She is not in acute distress.     Appearance: Normal appearance. She is well-developed. She is not ill-appearing or toxic-appearing.   HENT:      Head: Normocephalic and atraumatic.      Salivary Glands: Right salivary gland is not diffusely enlarged or tender. Left salivary gland is not diffusely enlarged or tender.      Right Ear: Hearing, tympanic membrane, ear canal and external ear normal. No decreased hearing noted. No swelling or tenderness. No middle ear effusion. Tympanic membrane is not scarred, perforated, erythematous, retracted or bulging.      Left Ear: Hearing, tympanic membrane, ear canal and external ear normal. No decreased hearing noted. No swelling or tenderness.  No middle ear effusion. Tympanic membrane is not scarred, perforated, erythematous, retracted or bulging.      Nose: Nose normal. No mucosal " edema or rhinorrhea.      Right Sinus: No maxillary sinus tenderness or frontal sinus tenderness.      Left Sinus: No maxillary sinus tenderness or frontal sinus tenderness.      Mouth/Throat:      Pharynx: Uvula midline.   Eyes:      General: Lids are normal.      Extraocular Movements: Extraocular movements intact.      Conjunctiva/sclera: Conjunctivae normal.      Pupils: Pupils are equal, round, and reactive to light.   Neck:      Musculoskeletal: Normal range of motion and neck supple.      Thyroid: No thyroid mass or thyromegaly.      Vascular: No carotid bruit.      Trachea: Trachea and phonation normal. No tracheal deviation.   Cardiovascular:      Rate and Rhythm: Normal rate and regular rhythm.      Pulses:           Carotid pulses are 2+ on the right side and 2+ on the left side.       Radial pulses are 2+ on the right side and 2+ on the left side.        Dorsalis pedis pulses are 2+ on the right side and 2+ on the left side.        Posterior tibial pulses are 2+ on the right side and 2+ on the left side.      Heart sounds: Normal heart sounds. No murmur.   Pulmonary:      Effort: Pulmonary effort is normal. No respiratory distress.      Breath sounds: Normal breath sounds. No decreased breath sounds, wheezing, rhonchi or rales.   Chest:      Chest wall: No tenderness.   Abdominal:      General: Abdomen is flat. Bowel sounds are normal. There is no distension.      Palpations: Abdomen is soft. Abdomen is not rigid. There is no mass.      Tenderness: There is no abdominal tenderness. There is no guarding or rebound.      Hernia: No hernia is present.   Musculoskeletal: Normal range of motion.         General: No tenderness or deformity.      Right lower leg: No edema.      Left lower leg: No edema.   Lymphadenopathy:      Head:      Right side of head: No submental, submandibular, tonsillar, preauricular, posterior auricular or occipital adenopathy.      Left side of head: No submental, submandibular,  tonsillar, preauricular, posterior auricular or occipital adenopathy.      Cervical: No cervical adenopathy.   Skin:     General: Skin is warm and dry.      Capillary Refill: Capillary refill takes less than 2 seconds.      Findings: No rash.   Neurological:      Mental Status: She is alert and oriented to person, place, and time.      GCS: GCS eye subscore is 4. GCS verbal subscore is 5. GCS motor subscore is 6.      Cranial Nerves: Cranial nerves are intact. No cranial nerve deficit.      Sensory: Sensation is intact. No sensory deficit.      Motor: Motor function is intact. No abnormal muscle tone.      Coordination: Coordination is intact. Coordination normal.      Gait: Gait is intact. Gait normal.      Deep Tendon Reflexes:      Reflex Scores:       Tricep reflexes are 2+ on the right side and 2+ on the left side.       Bicep reflexes are 2+ on the right side and 2+ on the left side.       Brachioradialis reflexes are 2+ on the right side and 2+ on the left side.       Patellar reflexes are 2+ on the right side and 2+ on the left side.  Psychiatric:         Attention and Perception: Attention and perception normal.         Mood and Affect: Mood and affect normal.         Speech: Speech normal.         Behavior: Behavior normal. Behavior is cooperative.         Thought Content: Thought content normal.         Cognition and Memory: Cognition and memory normal.         Judgment: Judgment normal.         Labs - Per visit orders  Results for orders placed or performed in visit on 10/16/19   CBC (No Diff)    Specimen: Blood   Result Value Ref Range    WBC 6.65 3.40 - 10.80 10*3/mm3    RBC 4.35 3.77 - 5.28 10*6/mm3    Hemoglobin 13.3 12.0 - 15.9 g/dL    Hematocrit 39.4 34.0 - 46.6 %    MCV 90.6 79.0 - 97.0 fL    MCH 30.6 26.6 - 33.0 pg    MCHC 33.8 31.5 - 35.7 g/dL    RDW 12.0 (L) 12.3 - 15.4 %    RDW-SD 40.3 37.0 - 54.0 fl    MPV 10.4 6.0 - 12.0 fL    Platelets 293 140 - 450 10*3/mm3   Basic metabolic panel     Specimen: Blood   Result Value Ref Range    Glucose 81 65 - 99 mg/dL    BUN 12 6 - 20 mg/dL    Creatinine 0.76 0.57 - 1.00 mg/dL    Sodium 135 (L) 136 - 145 mmol/L    Potassium 3.9 3.5 - 5.2 mmol/L    Chloride 98 98 - 107 mmol/L    CO2 24.9 22.0 - 29.0 mmol/L    Calcium 8.8 8.6 - 10.5 mg/dL    eGFR Non African Amer 83 >60 mL/min/1.73    BUN/Creatinine Ratio 15.8 7.0 - 25.0    Anion Gap 12.1 5.0 - 15.0 mmol/L   Lipid Panel    Specimen: Blood   Result Value Ref Range    Total Cholesterol 174 0 - 200 mg/dL    Triglycerides 74 0 - 150 mg/dL    HDL Cholesterol 61 (H) 40 - 60 mg/dL    LDL Cholesterol  98 0 - 100 mg/dL    VLDL Cholesterol 14.8 5 - 40 mg/dL    LDL/HDL Ratio 1.61    Vitamin D 25 hydroxy    Specimen: Blood   Result Value Ref Range    25 Hydroxy, Vitamin D 33.9 30.0 - 100.0 ng/ml       Assessment/Plan    Diagnoses and all orders for this visit:    1. Annual physical exam (Primary)  -     CBC (No Diff); Future  -     Comprehensive Metabolic Panel; Future  -     Lipid Panel; Future  -     TSH; Future  -     T4, free; Future    2. Encounter for birth control pills maintenance  -     norgestimate-ethinyl estradiol (Sprintec 28) 0.25-35 MG-MCG per tablet; Take 1 tablet by mouth Daily.  Dispense: 84 tablet; Refill: 3    3. Need for vaccination  -     Tdap Vaccine Greater Than or Equal To 6yo IM    4. Encounter for screening for malignant neoplasm of colon  -     Cologuard - Stool, Per Rectum; Future    The patient is here for an annual health maintenance visit. Pap and gyn exams are deffered per patient preference and are UTD. Currently, the patient consumes a healthy diet and has an adequate exercise regimen. Screening lab work is ordered, will review and notify patient of results when received. Immunizations given today for health maintenance include: Tdap.  Discussed with and counseled patient regarding nutrition, aerobic exercise, routine dental evaluations, routine eye exams, reproductive health, cardiovascular  risk reduction, sunscreen use, self skin examination (annual dermatology evaluations), safe relationships, smoke detectors in home, and seat belt use (general overall safety).  Further recommendations after lab evaluation.  Annual wellness evaluations recommended.     Plan of care reviewed with patient at conclusion of today's visit. Patient education was provided regarding diagnosis, management, and prescribed or recommended OTC medications. Patient was informed to notify office of any new, worsening, or persistent symptoms. Patient verbalized understanding and agreement with plan of care.     Follow-Up  Return in about 1 year (around 10/20/2021) for Annual.      Electronically Signed By:  LUCINDA Dumont Dragon/Transcription Disclaimer:  Please note that portions of this encounter note were completed using electronic transcription/translation of spoken language to printed text.  The electronic transcription/translation of spoken language may permit erroneous, or at times, nonsensical words or phrases to be inadvertently transcribed.  Although I have reviewed the note for such errors, some may still exist in this documentation.

## 2020-10-21 LAB
ALBUMIN SERPL-MCNC: 4.2 G/DL (ref 3.5–5.2)
ALBUMIN/GLOB SERPL: 1.3 G/DL
ALP SERPL-CCNC: 45 U/L (ref 39–117)
ALT SERPL W P-5'-P-CCNC: 18 U/L (ref 1–33)
ANION GAP SERPL CALCULATED.3IONS-SCNC: 7.7 MMOL/L (ref 5–15)
AST SERPL-CCNC: 22 U/L (ref 1–32)
BILIRUB SERPL-MCNC: 0.5 MG/DL (ref 0–1.2)
BUN SERPL-MCNC: 13 MG/DL (ref 6–20)
BUN/CREAT SERPL: 15.5 (ref 7–25)
CALCIUM SPEC-SCNC: 8.8 MG/DL (ref 8.6–10.5)
CHLORIDE SERPL-SCNC: 103 MMOL/L (ref 98–107)
CHOLEST SERPL-MCNC: 213 MG/DL (ref 0–200)
CO2 SERPL-SCNC: 26.3 MMOL/L (ref 22–29)
CREAT SERPL-MCNC: 0.84 MG/DL (ref 0.57–1)
GFR SERPL CREATININE-BSD FRML MDRD: 73 ML/MIN/1.73
GLOBULIN UR ELPH-MCNC: 3.3 GM/DL
GLUCOSE SERPL-MCNC: 73 MG/DL (ref 65–99)
HDLC SERPL-MCNC: 68 MG/DL (ref 40–60)
LDLC SERPL CALC-MCNC: 125 MG/DL (ref 0–100)
LDLC/HDLC SERPL: 1.79 {RATIO}
POTASSIUM SERPL-SCNC: 4.5 MMOL/L (ref 3.5–5.2)
PROT SERPL-MCNC: 7.5 G/DL (ref 6–8.5)
SODIUM SERPL-SCNC: 137 MMOL/L (ref 136–145)
T4 FREE SERPL-MCNC: 1.09 NG/DL (ref 0.93–1.7)
TRIGL SERPL-MCNC: 115 MG/DL (ref 0–150)
TSH SERPL DL<=0.05 MIU/L-ACNC: 1.35 UIU/ML (ref 0.27–4.2)
VLDLC SERPL-MCNC: 20 MG/DL (ref 5–40)

## 2020-10-25 DIAGNOSIS — Z30.41 ENCOUNTER FOR BIRTH CONTROL PILLS MAINTENANCE: ICD-10-CM

## 2020-12-02 ENCOUNTER — TRANSCRIBE ORDERS (OUTPATIENT)
Dept: ADMINISTRATIVE | Facility: HOSPITAL | Age: 45
End: 2020-12-02

## 2020-12-02 DIAGNOSIS — Z12.31 VISIT FOR SCREENING MAMMOGRAM: Primary | ICD-10-CM

## 2021-02-26 ENCOUNTER — HOSPITAL ENCOUNTER (OUTPATIENT)
Dept: MAMMOGRAPHY | Facility: HOSPITAL | Age: 46
Discharge: HOME | End: 2021-02-26
Admitting: NURSE PRACTITIONER

## 2021-02-26 DIAGNOSIS — Z12.31 VISIT FOR SCREENING MAMMOGRAM: ICD-10-CM

## 2021-02-26 PROCEDURE — 77067 SCR MAMMO BI INCL CAD: CPT

## 2021-02-26 PROCEDURE — 77067 SCR MAMMO BI INCL CAD: CPT | Performed by: RADIOLOGY

## 2021-02-26 PROCEDURE — 77063 BREAST TOMOSYNTHESIS BI: CPT | Performed by: RADIOLOGY

## 2021-02-26 PROCEDURE — 77063 BREAST TOMOSYNTHESIS BI: CPT

## 2021-09-24 DIAGNOSIS — Z30.41 ENCOUNTER FOR BIRTH CONTROL PILLS MAINTENANCE: ICD-10-CM

## 2021-09-24 NOTE — TELEPHONE ENCOUNTER
Last Office Visit: 10/20/20  Next Office Visit:10/27/21-Ann CAREY    Labs completed in past 6 months? no  Labs completed in past year? yes    Last Refill Date:10/20/20-Glendy  Quantity:84  Refills:1    Pharmacy:     Please review pended refill request for any changes needed on refills or quantities. Thank you!

## 2021-10-27 ENCOUNTER — LAB (OUTPATIENT)
Dept: LAB | Facility: HOSPITAL | Age: 46
End: 2021-10-27

## 2021-10-27 ENCOUNTER — OFFICE VISIT (OUTPATIENT)
Dept: INTERNAL MEDICINE | Facility: CLINIC | Age: 46
End: 2021-10-27

## 2021-10-27 VITALS
DIASTOLIC BLOOD PRESSURE: 70 MMHG | BODY MASS INDEX: 21.53 KG/M2 | OXYGEN SATURATION: 98 % | SYSTOLIC BLOOD PRESSURE: 114 MMHG | HEART RATE: 88 BPM | TEMPERATURE: 97.8 F | WEIGHT: 134 LBS | HEIGHT: 66 IN | RESPIRATION RATE: 14 BRPM

## 2021-10-27 DIAGNOSIS — Z30.41 ENCOUNTER FOR SURVEILLANCE OF CONTRACEPTIVE PILLS: ICD-10-CM

## 2021-10-27 DIAGNOSIS — Z11.59 ENCOUNTER FOR HEPATITIS C SCREENING TEST FOR LOW RISK PATIENT: ICD-10-CM

## 2021-10-27 DIAGNOSIS — L98.9 SKIN ABNORMALITY: ICD-10-CM

## 2021-10-27 DIAGNOSIS — Z00.00 HEALTHCARE MAINTENANCE: Primary | ICD-10-CM

## 2021-10-27 DIAGNOSIS — Z12.31 ENCOUNTER FOR SCREENING MAMMOGRAM FOR MALIGNANT NEOPLASM OF BREAST: ICD-10-CM

## 2021-10-27 DIAGNOSIS — E55.9 VITAMIN D DEFICIENCY: ICD-10-CM

## 2021-10-27 DIAGNOSIS — E78.1 HYPERTRIGLYCERIDEMIA, ESSENTIAL: ICD-10-CM

## 2021-10-27 LAB
ALBUMIN SERPL-MCNC: 4.6 G/DL (ref 3.5–5.2)
ALBUMIN/GLOB SERPL: 1.5 G/DL
ALP SERPL-CCNC: 49 U/L (ref 39–117)
ALT SERPL W P-5'-P-CCNC: 22 U/L (ref 1–33)
ANION GAP SERPL CALCULATED.3IONS-SCNC: 12.3 MMOL/L (ref 5–15)
AST SERPL-CCNC: 25 U/L (ref 1–32)
BILIRUB SERPL-MCNC: 0.6 MG/DL (ref 0–1.2)
BUN SERPL-MCNC: 12 MG/DL (ref 6–20)
BUN/CREAT SERPL: 16 (ref 7–25)
CALCIUM SPEC-SCNC: 9.3 MG/DL (ref 8.6–10.5)
CHLORIDE SERPL-SCNC: 100 MMOL/L (ref 98–107)
CHOLEST SERPL-MCNC: 232 MG/DL (ref 0–200)
CO2 SERPL-SCNC: 24.7 MMOL/L (ref 22–29)
CREAT SERPL-MCNC: 0.75 MG/DL (ref 0.57–1)
GFR SERPL CREATININE-BSD FRML MDRD: 83 ML/MIN/1.73
GLOBULIN UR ELPH-MCNC: 3.1 GM/DL
GLUCOSE SERPL-MCNC: 83 MG/DL (ref 65–99)
HDLC SERPL-MCNC: 65 MG/DL (ref 40–60)
LDLC SERPL CALC-MCNC: 147 MG/DL (ref 0–100)
LDLC/HDLC SERPL: 2.22 {RATIO}
POTASSIUM SERPL-SCNC: 4.4 MMOL/L (ref 3.5–5.2)
PROT SERPL-MCNC: 7.7 G/DL (ref 6–8.5)
SODIUM SERPL-SCNC: 137 MMOL/L (ref 136–145)
TRIGL SERPL-MCNC: 114 MG/DL (ref 0–150)
TSH SERPL DL<=0.05 MIU/L-ACNC: 0.88 UIU/ML (ref 0.27–4.2)
VLDLC SERPL-MCNC: 20 MG/DL (ref 5–40)

## 2021-10-27 PROCEDURE — 36415 COLL VENOUS BLD VENIPUNCTURE: CPT

## 2021-10-27 PROCEDURE — 80053 COMPREHEN METABOLIC PANEL: CPT

## 2021-10-27 PROCEDURE — 80061 LIPID PANEL: CPT

## 2021-10-27 PROCEDURE — 84443 ASSAY THYROID STIM HORMONE: CPT

## 2021-10-27 PROCEDURE — 99214 OFFICE O/P EST MOD 30 MIN: CPT | Performed by: STUDENT IN AN ORGANIZED HEALTH CARE EDUCATION/TRAINING PROGRAM

## 2021-10-27 PROCEDURE — 99396 PREV VISIT EST AGE 40-64: CPT | Performed by: STUDENT IN AN ORGANIZED HEALTH CARE EDUCATION/TRAINING PROGRAM

## 2021-10-27 PROCEDURE — 86803 HEPATITIS C AB TEST: CPT

## 2021-10-27 PROCEDURE — 82652 VIT D 1 25-DIHYDROXY: CPT

## 2021-10-27 NOTE — PROGRESS NOTES
Internal Medicine Physical Note      Date: 10/27/2021     Patient Name: Margot Lamar  : 1975   MRN: [id number]     Chief Complaint:    Chief Complaint   Patient presents with   • Annual Exam   • Hyperlipidemia       History of Present Illness: Margot Lamar is a 46 y.o. female who is here today for her annual health maintenance exam and physical. She has no acute complaints or concerns today. She has two children aged 11 and 13.     General Health: She describes her health as great.  She has been doing well over the last year with no falls, hospitalizations, or ER visits. She has had no surgeries or changes in medical history over the last year.  There have also been no changes in family history.  She is up-to-date with eye and dental exams. She denies changes in vision and hearing. She describes her mood as good. She is up-to-d ate on immunizations. Her last period was 2021. She uses OCPs for contraception.  She has no family history of breast cancer or colon cancer.    Lifestyle: She eats a diverse and healthy diet and exercises by getting 2 hours and 30 minutes of physical activity per week. She does not use tobacco products or illicit drugs.  She consumes about 1 alcoholic beverage per week she reports wearing seat belts and avoidance of texting while driving. She wears sunscreen while outdoors on most occasions.     Subjective     Review of System: Review of Systems   Constitutional: Negative for chills, fatigue and fever.   Respiratory: Negative for cough and shortness of breath.    Cardiovascular: Negative for chest pain, palpitations and leg swelling.   Gastrointestinal: Negative for abdominal pain, constipation and diarrhea.   Musculoskeletal: Negative for arthralgias and back pain.   Neurological: Negative for headaches.      I have reviewed the ROS documented by my clinical staff, updated appropriately and I agree. Sujata Juarez MD    Past Medical History: History  "reviewed. No pertinent past medical history.    Past Surgical History: History reviewed. No pertinent surgical history.    Family History:   Family History   Problem Relation Age of Onset   • Diabetes Sister    • Heart disease Maternal Grandfather    • Breast cancer Neg Hx    • Ovarian cancer Neg Hx        Social History:   Social History     Socioeconomic History   • Marital status:    Tobacco Use   • Smoking status: Never Smoker   • Smokeless tobacco: Never Used   Substance and Sexual Activity   • Alcohol use: Yes     Comment: 2 drinks per month   • Drug use: No   • Sexual activity: Defer       Allergies:   Allergies   Allergen Reactions   • Penicillins Angioedema       Depression: PHQ-2 Depression Screening  Little interest or pleasure in doing things?  0   Feeling down, depressed, or hopeless?  0   PHQ-2 Total Score  0       Objective     Physical Exam:  Vital Signs:   Vitals:    10/27/21 0815   BP: 114/70   Pulse: 88   Resp: 14   Temp: 97.8 °F (36.6 °C)   SpO2: 98%   Weight: 60.8 kg (134 lb)   Height: 167.6 cm (66\")   PainSc: 0-No pain     Body mass index is 21.63 kg/m².     Physical Exam  Vitals and nursing note reviewed.   Constitutional:       General: She is not in acute distress.     Appearance: Normal appearance.   HENT:      Head: Normocephalic and atraumatic.   Eyes:      Extraocular Movements: Extraocular movements intact.      Pupils: Pupils are equal, round, and reactive to light.   Cardiovascular:      Rate and Rhythm: Normal rate and regular rhythm.      Pulses: Normal pulses.      Heart sounds: Normal heart sounds. No murmur heard.      Pulmonary:      Effort: Pulmonary effort is normal. No respiratory distress.      Breath sounds: Normal breath sounds. No wheezing, rhonchi or rales.   Abdominal:      General: Abdomen is flat. Bowel sounds are normal.      Palpations: Abdomen is soft.   Musculoskeletal:         General: No swelling. Normal range of motion.      Cervical back: Normal range " of motion and neck supple. No tenderness.      Right lower leg: No edema.      Left lower leg: No edema.   Lymphadenopathy:      Cervical: No cervical adenopathy.   Skin:     General: Skin is warm and dry.      Comments: Small, 1/2 cm scab noted mid back just underneath her bra strap.  No tenderness.   Neurological:      General: No focal deficit present.      Mental Status: She is alert and oriented to person, place, and time.      Gait: Gait normal.   Psychiatric:         Mood and Affect: Mood normal.         Behavior: Behavior normal.         Assessment / Plan      Assessment/Plan:   Diagnoses and all orders for this visit:    1. Healthcare maintenance (Primary)  - Cervical Cancer Screening / Pap Smear (age 21 - 65, every 3-5 years): Last in 2018 was reportedly normal with cytology and HPV testing.  This was done at [facility].  With shared decision making, decision made to perform Pap smears every 5 years.  Due 2023.  - Breast Cancer Screening / Mammogram (age 40 - 75, every 1-2 years): Last in 12/02/2020, BI-RADS 1.  Repeat due in 12/2021.  Ordered  - Colon Cancer Screening / Colonoscopy (age 45 - 75): Cologuard performed 10/26/2020, negative.  Repeat due in 2023.  - HCV Screening: Obtained today  - Immunizations: Up-to-date  - Cardiovascular Health Screening / ASCVD: ASCVD to be calculated with lipid panel ordered today. No family history of MI or stroke.  Recommended 150 minutes of moderate intensity physical activity each week and a diet rich in vegetables and unsaturated fat while avoiding saturated fats and processed foods as able.   - Depression Screening: PHQ2 -0  - Advice and education was given regarding routine dental evaluations, routine eye exams, reproductive health, cardiovascular risk reduction, sunscreen use, self skin examination (annual dermatology evaluations) and seat belt use (general overall safety).  Further recommendations after lab evaluation.  Annual wellness evaluations  recommended.     2. Vitamin D deficiency  Patient has been taking vitamin D supplementation sporadically.  Repeat vitamin D level ordered    3. Hypertriglyceridemia, essential  -     Comprehensive Metabolic Panel; Future  -     Lipid Panel; Future  -     TSH Rfx On Abnormal To Free T4; Future    4. Encounter for surveillance of contraceptive pills  Continue Sprintec.    5. Encounter for hepatitis C screening test for low risk patient  -     Hepatitis C Antibody; Future    6. Skin abnormality  Subcentimeter scab noted underneath bra strap that has been present for several months.  Recommended patient have  look at it and monitor it over the next several months.  If no improvement, will refer to dermatology.  Patient to call clinic and let us know if this is needed.    Follow Up:   Return in about 1 year (around 10/27/2022) for Annual.    Time:   I spent approximately 30 minutes providing clinical care for this patient; including review of patient's chart and provider documentation, face to face time spent with patient in examination room (obtaining history, performing physical exam, discussing diagnosis and management options), placing orders, and completing patient documentation.     Addendum (11/29/2021): Labs reviewed in person with patient CMP, TSH, vitamin D, hepatitis C antibody all without abnormality.  Total cholesterol and LDL mildly elevated but ASCVD risk is quite low at 0.7%.  Lifestyle modifications recommended but no statin indicated.    Sujata Juarez MD  [facility]

## 2021-10-28 LAB — HCV AB SER DONR QL: NORMAL

## 2021-10-30 LAB — 1,25(OH)2D SERPL-MCNC: 68 PG/ML (ref 19.9–79.3)

## 2021-11-29 DIAGNOSIS — Z30.41 ENCOUNTER FOR BIRTH CONTROL PILLS MAINTENANCE: ICD-10-CM

## 2021-11-29 RX ORDER — NORGESTIMATE AND ETHINYL ESTRADIOL 0.25-0.035
1 KIT ORAL DAILY
Qty: 28 TABLET | Refills: 11 | Status: SHIPPED | OUTPATIENT
Start: 2021-11-29 | End: 2022-10-28 | Stop reason: SDUPTHER

## 2022-02-28 ENCOUNTER — HOSPITAL ENCOUNTER (OUTPATIENT)
Dept: MAMMOGRAPHY | Facility: HOSPITAL | Age: 47
Discharge: HOME | End: 2022-02-28
Admitting: STUDENT IN AN ORGANIZED HEALTH CARE EDUCATION/TRAINING PROGRAM

## 2022-02-28 DIAGNOSIS — Z12.31 ENCOUNTER FOR SCREENING MAMMOGRAM FOR MALIGNANT NEOPLASM OF BREAST: ICD-10-CM

## 2022-02-28 PROCEDURE — 77067 SCR MAMMO BI INCL CAD: CPT

## 2022-02-28 PROCEDURE — 77063 BREAST TOMOSYNTHESIS BI: CPT | Performed by: RADIOLOGY

## 2022-02-28 PROCEDURE — 77067 SCR MAMMO BI INCL CAD: CPT | Performed by: RADIOLOGY

## 2022-02-28 PROCEDURE — 77063 BREAST TOMOSYNTHESIS BI: CPT

## 2022-10-28 ENCOUNTER — LAB (OUTPATIENT)
Dept: LAB | Facility: HOSPITAL | Age: 47
End: 2022-10-28

## 2022-10-28 ENCOUNTER — OFFICE VISIT (OUTPATIENT)
Dept: INTERNAL MEDICINE | Facility: CLINIC | Age: 47
End: 2022-10-28

## 2022-10-28 VITALS
SYSTOLIC BLOOD PRESSURE: 108 MMHG | DIASTOLIC BLOOD PRESSURE: 76 MMHG | OXYGEN SATURATION: 95 % | BODY MASS INDEX: 21.38 KG/M2 | WEIGHT: 133 LBS | HEIGHT: 66 IN | TEMPERATURE: 97.7 F | HEART RATE: 78 BPM

## 2022-10-28 DIAGNOSIS — Z30.41 ENCOUNTER FOR BIRTH CONTROL PILLS MAINTENANCE: ICD-10-CM

## 2022-10-28 DIAGNOSIS — Z00.00 ANNUAL PHYSICAL EXAM: Primary | ICD-10-CM

## 2022-10-28 DIAGNOSIS — E78.00 HYPERCHOLESTEROLEMIA: ICD-10-CM

## 2022-10-28 DIAGNOSIS — Z23 NEED FOR INFLUENZA VACCINATION: ICD-10-CM

## 2022-10-28 DIAGNOSIS — Z00.00 ANNUAL PHYSICAL EXAM: ICD-10-CM

## 2022-10-28 PROCEDURE — 99396 PREV VISIT EST AGE 40-64: CPT | Performed by: INTERNAL MEDICINE

## 2022-10-28 PROCEDURE — 85027 COMPLETE CBC AUTOMATED: CPT

## 2022-10-28 PROCEDURE — 90471 IMMUNIZATION ADMIN: CPT | Performed by: INTERNAL MEDICINE

## 2022-10-28 PROCEDURE — 83036 HEMOGLOBIN GLYCOSYLATED A1C: CPT

## 2022-10-28 PROCEDURE — 80061 LIPID PANEL: CPT

## 2022-10-28 PROCEDURE — 80053 COMPREHEN METABOLIC PANEL: CPT

## 2022-10-28 PROCEDURE — 90686 IIV4 VACC NO PRSV 0.5 ML IM: CPT | Performed by: INTERNAL MEDICINE

## 2022-10-28 RX ORDER — NORGESTIMATE AND ETHINYL ESTRADIOL 0.25-0.035
1 KIT ORAL DAILY
Qty: 28 TABLET | Refills: 11 | Status: SHIPPED | OUTPATIENT
Start: 2022-10-28

## 2022-10-28 NOTE — PROGRESS NOTES
Internal Medicine Annual Exam  Margot Lamar is a 47 y.o. female who presents today for an annual exam and with concerns as outlined below.    Chief Complaint  Chief Complaint   Patient presents with   • Hyperlipidemia   • Establish Care        HPI  Ms. Lamar comes in today for her physical and to transition care. She is generally healthy. 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 On OCP for contraception which she wishes to continue. Other than small amount of breakthrough bleeding near her cycle this past month no other concerns. She has had HLD. She notes that she has been running. She does a 5k weekly and hills two days a week. She also walks with friends 2 days a week. Her diet is healthy. She is up to date with mammogram and cologuard. She would like flu shot today. Denies use of tobacco, ecigarettes, illicit drugs, and drinks minimal alcohol.      Hyperlipidemia         Review of Systems  Review of Systems   Constitutional: Negative.    Respiratory: Negative.    Cardiovascular: Negative.    Gastrointestinal: Negative.    Genitourinary: Positive for menstrual problem (breakthrough bleeding on OCP). Negative for decreased urine volume and difficulty urinating.   Musculoskeletal: Negative.    Skin: Negative.    Neurological: Negative.    Psychiatric/Behavioral: Negative.         Past Medical History  History reviewed. No pertinent past medical history.     Surgical History  History reviewed. No pertinent surgical history.     Family History  Family History   Problem Relation Age of Onset   • Parkinsonism Mother         Progressive supranuclear palsy   • Stroke Father    • Diabetes Half-Sister    • Arrhythmia Brother         benign, since teenage years   • Heart disease Maternal Grandfather    • Heart attack Maternal Grandfather    • Early death Maternal Grandfather    • No Known Problems Sister    • Breast cancer Neg Hx    • Ovarian cancer Neg Hx         Social  "History  Social History     Socioeconomic History   • Marital status:    Tobacco Use   • Smoking status: Never   • Smokeless tobacco: Never   Vaping Use   • Vaping Use: Never used   Substance and Sexual Activity   • Alcohol use: Yes     Alcohol/week: 1.0 standard drink     Types: 1 Glasses of wine per week     Comment: 2 drinks per month   • Drug use: No   • Sexual activity: Yes     Partners: Male     Birth control/protection: Pill     Comment: Only with         Current Medications  Current Outpatient Medications on File Prior to Visit   Medication Sig Dispense Refill   • [DISCONTINUED] norgestimate-ethinyl estradiol (Sprintec 28) 0.25-35 MG-MCG per tablet Take 1 tablet by mouth Daily. 28 tablet 11   • [DISCONTINUED] folic acid (FOLVITE) 400 MCG tablet Take 400 mcg by mouth Daily.       No current facility-administered medications on file prior to visit.       Allergies  Allergies   Allergen Reactions   • Penicillins Angioedema        Objective  Visit Vitals  /76   Pulse 78   Temp 97.7 °F (36.5 °C)   Ht 167.6 cm (66\")   Wt 60.3 kg (133 lb)   SpO2 95%   BMI 21.47 kg/m²        Physical Exam  Physical Exam  Vitals and nursing note reviewed.   Constitutional:       General: She is not in acute distress.     Appearance: Normal appearance. She is well-developed and normal weight. She is not ill-appearing, toxic-appearing or diaphoretic.   HENT:      Head: Normocephalic and atraumatic.      Right Ear: Tympanic membrane, ear canal and external ear normal.      Left Ear: Tympanic membrane, ear canal and external ear normal.      Nose: Nose normal.   Eyes:      General: No scleral icterus.     Conjunctiva/sclera: Conjunctivae normal.   Cardiovascular:      Rate and Rhythm: Normal rate and regular rhythm.      Heart sounds: Normal heart sounds. No murmur heard.  Pulmonary:      Effort: Pulmonary effort is normal. No respiratory distress.      Breath sounds: Normal breath sounds.   Abdominal:      General: " Bowel sounds are normal. There is no distension.      Palpations: Abdomen is soft. There is no mass.      Tenderness: There is no abdominal tenderness.   Musculoskeletal:         General: No deformity.      Cervical back: Neck supple.      Right lower leg: No edema.      Left lower leg: No edema.   Lymphadenopathy:      Cervical: No cervical adenopathy.   Skin:     General: Skin is warm and dry.      Findings: No rash.   Neurological:      General: No focal deficit present.      Mental Status: She is alert and oriented to person, place, and time.      Gait: Gait normal.   Psychiatric:         Mood and Affect: Mood normal.         Behavior: Behavior normal.         Thought Content: Thought content normal.         Judgment: Judgment normal.          Results  Results for orders placed or performed in visit on 10/27/21   Comprehensive Metabolic Panel    Specimen: Blood   Result Value Ref Range    Glucose 83 65 - 99 mg/dL    BUN 12 6 - 20 mg/dL    Creatinine 0.75 0.57 - 1.00 mg/dL    Sodium 137 136 - 145 mmol/L    Potassium 4.4 3.5 - 5.2 mmol/L    Chloride 100 98 - 107 mmol/L    CO2 24.7 22.0 - 29.0 mmol/L    Calcium 9.3 8.6 - 10.5 mg/dL    Total Protein 7.7 6.0 - 8.5 g/dL    Albumin 4.60 3.50 - 5.20 g/dL    ALT (SGPT) 22 1 - 33 U/L    AST (SGOT) 25 1 - 32 U/L    Alkaline Phosphatase 49 39 - 117 U/L    Total Bilirubin 0.6 0.0 - 1.2 mg/dL    eGFR Non African Amer 83 >60 mL/min/1.73    Globulin 3.1 gm/dL    A/G Ratio 1.5 g/dL    BUN/Creatinine Ratio 16.0 7.0 - 25.0    Anion Gap 12.3 5.0 - 15.0 mmol/L   Lipid Panel    Specimen: Blood   Result Value Ref Range    Total Cholesterol 232 (H) 0 - 200 mg/dL    Triglycerides 114 0 - 150 mg/dL    HDL Cholesterol 65 (H) 40 - 60 mg/dL    LDL Cholesterol  147 (H) 0 - 100 mg/dL    VLDL Cholesterol 20 5 - 40 mg/dL    LDL/HDL Ratio 2.22    Vitamin D 1,25 Dihydroxy    Specimen: Blood   Result Value Ref Range    1,25-Dihydroxy, Vitamin D 68.0 19.9 - 79.3 pg/mL   TSH Rfx On Abnormal To Free  T4    Specimen: Blood   Result Value Ref Range    TSH 0.882 0.270 - 4.200 uIU/mL   Hepatitis C Antibody    Specimen: Blood   Result Value Ref Range    Hepatitis C Ab Non-Reactive Non-Reactive        Assessment and Plan  Diagnoses and all orders for this visit:    Annual physical exam  - Counseling was given to patient for the following topics:  appropriate exercise, healthy eating habits, disease prevention, importance of self breast exam and breast health and importance of immunizations, including risks and benefits. Also discussed the importance of regular dental and vision care.  -     CBC (No Diff); Future  -     Comprehensive Metabolic Panel; Future  -     Hemoglobin A1c; Future    Encounter for birth control pills maintenance  - continue sprintec, remains appropriate for OCP  - if breakthrough bleeding persists can return to discuss    Hypercholesterolemia  - very active and healthy lifestyle  - will recheck lipid panel    Need for influenza vaccination  -     FluLaval/Fluarix/Fluzone >6 Months      Health Maintenance   Topic Date Due   • COVID-19 Vaccine (4 - Booster for Pfizer series) 02/08/2022   • INFLUENZA VACCINE  08/01/2022   • LIPID PANEL  10/27/2022   • ANNUAL PHYSICAL  10/28/2022   • MAMMOGRAM  02/28/2023   • PAP SMEAR  10/12/2023   • COLORECTAL CANCER SCREENING  10/27/2023   • TDAP/TD VACCINES (3 - Td or Tdap) 10/20/2030   • HEPATITIS C SCREENING  Completed   • Pneumococcal Vaccine 0-64  Aged Out       Health Maintenance  - Pap smear: due next year  - Mammogram: 2/2022 BIRADS 1  - Colonoscopy: cologuard negative 2020  - HCV: negative  - Immunizations: flu today. COVID discussed. Tdap 10/2020.  - Depression screening: negative 10/2022    Return in about 1 year (around 10/28/2023) for Annual with pap smear 45 minutes, Labs today.

## 2022-10-29 LAB
ALBUMIN SERPL-MCNC: 4.1 G/DL (ref 3.5–5.2)
ALBUMIN/GLOB SERPL: 1.1 G/DL
ALP SERPL-CCNC: 49 U/L (ref 39–117)
ALT SERPL W P-5'-P-CCNC: 22 U/L (ref 1–33)
ANION GAP SERPL CALCULATED.3IONS-SCNC: 11.3 MMOL/L (ref 5–15)
AST SERPL-CCNC: 23 U/L (ref 1–32)
BILIRUB SERPL-MCNC: 0.7 MG/DL (ref 0–1.2)
BUN SERPL-MCNC: 11 MG/DL (ref 6–20)
BUN/CREAT SERPL: 13.3 (ref 7–25)
CALCIUM SPEC-SCNC: 9.3 MG/DL (ref 8.6–10.5)
CHLORIDE SERPL-SCNC: 102 MMOL/L (ref 98–107)
CHOLEST SERPL-MCNC: 236 MG/DL (ref 0–200)
CO2 SERPL-SCNC: 24.7 MMOL/L (ref 22–29)
CREAT SERPL-MCNC: 0.83 MG/DL (ref 0.57–1)
DEPRECATED RDW RBC AUTO: 40.4 FL (ref 37–54)
EGFRCR SERPLBLD CKD-EPI 2021: 87.6 ML/MIN/1.73
ERYTHROCYTE [DISTWIDTH] IN BLOOD BY AUTOMATED COUNT: 12.2 % (ref 12.3–15.4)
GLOBULIN UR ELPH-MCNC: 3.6 GM/DL
GLUCOSE SERPL-MCNC: 73 MG/DL (ref 65–99)
HBA1C MFR BLD: 5.4 % (ref 4.8–5.6)
HCT VFR BLD AUTO: 41 % (ref 34–46.6)
HDLC SERPL-MCNC: 70 MG/DL (ref 40–60)
HGB BLD-MCNC: 14.3 G/DL (ref 12–15.9)
LDLC SERPL CALC-MCNC: 156 MG/DL (ref 0–100)
LDLC/HDLC SERPL: 2.2 {RATIO}
MCH RBC QN AUTO: 31.3 PG (ref 26.6–33)
MCHC RBC AUTO-ENTMCNC: 34.9 G/DL (ref 31.5–35.7)
MCV RBC AUTO: 89.7 FL (ref 79–97)
PLATELET # BLD AUTO: 306 10*3/MM3 (ref 140–450)
PMV BLD AUTO: 10.3 FL (ref 6–12)
POTASSIUM SERPL-SCNC: 4.5 MMOL/L (ref 3.5–5.2)
PROT SERPL-MCNC: 7.7 G/DL (ref 6–8.5)
RBC # BLD AUTO: 4.57 10*6/MM3 (ref 3.77–5.28)
SODIUM SERPL-SCNC: 138 MMOL/L (ref 136–145)
TRIGL SERPL-MCNC: 61 MG/DL (ref 0–150)
VLDLC SERPL-MCNC: 10 MG/DL (ref 5–40)
WBC NRBC COR # BLD: 7.36 10*3/MM3 (ref 3.4–10.8)

## 2022-11-29 ENCOUNTER — TRANSCRIBE ORDERS (OUTPATIENT)
Dept: ADMINISTRATIVE | Facility: HOSPITAL | Age: 47
End: 2022-11-29

## 2022-11-29 DIAGNOSIS — Z12.31 VISIT FOR SCREENING MAMMOGRAM: Primary | ICD-10-CM

## 2023-03-01 ENCOUNTER — APPOINTMENT (OUTPATIENT)
Dept: MAMMOGRAPHY | Facility: HOSPITAL | Age: 48
End: 2023-03-01

## 2023-03-02 ENCOUNTER — TELEPHONE (OUTPATIENT)
Dept: INTERNAL MEDICINE | Facility: CLINIC | Age: 48
End: 2023-03-02
Payer: COMMERCIAL

## 2023-03-02 ENCOUNTER — HOSPITAL ENCOUNTER (OUTPATIENT)
Dept: MAMMOGRAPHY | Facility: HOSPITAL | Age: 48
Discharge: HOME | End: 2023-03-02
Payer: COMMERCIAL

## 2023-03-02 DIAGNOSIS — Z12.31 VISIT FOR SCREENING MAMMOGRAM: ICD-10-CM

## 2023-03-02 NOTE — TELEPHONE ENCOUNTER
She will need to be seen for evaluation of breast complaint to get order for diagnostic mammogram.

## 2023-03-02 NOTE — TELEPHONE ENCOUNTER
PATIENT WALKED IN AFTER BEING ACROSS THE STREET FOR A MAMMOGRAM AND THEY TOLD HER DR CHILDRESS NEEDS TO ORDER FOR  A DIAGNOSTIC M[facility]MOGRAM [facility]ENT [facility]. PLEASE ADVISE PATIENT WHEN THIS IS COMPLETED, THANK YOU.

## 2023-03-03 ENCOUNTER — OFFICE VISIT (OUTPATIENT)
Dept: INTERNAL MEDICINE | Facility: CLINIC | Age: 48
End: 2023-03-03
Payer: COMMERCIAL

## 2023-03-03 VITALS
DIASTOLIC BLOOD PRESSURE: 82 MMHG | OXYGEN SATURATION: 98 % | HEIGHT: 66 IN | TEMPERATURE: 98 F | SYSTOLIC BLOOD PRESSURE: 120 MMHG | HEART RATE: 82 BPM | BODY MASS INDEX: 21.73 KG/M2 | WEIGHT: 135.2 LBS

## 2023-03-03 DIAGNOSIS — N63.25 MASS OVERLAPPING MULTIPLE QUADRANTS OF LEFT BREAST: Primary | ICD-10-CM

## 2023-03-03 DIAGNOSIS — N63.15 MASS OVERLAPPING MULTIPLE QUADRANTS OF RIGHT BREAST: ICD-10-CM

## 2023-03-03 PROCEDURE — 99213 OFFICE O/P EST LOW 20 MIN: CPT | Performed by: INTERNAL MEDICINE

## 2023-03-03 NOTE — TELEPHONE ENCOUNTER
HUB TO READ     Left message for patient that she will need to be seen in office before diagnostic mammogram can be ordered. Office number given. Please schedule an appointment.

## 2023-03-03 NOTE — PROGRESS NOTES
"Internal Medicine Follow Up    Chief Complaint  Margot Lamar is a 47 y.o. female who presents today for follow up of chronic medical conditions outlined below.    Chief Complaint   Patient presents with   • Breast exam     Lump in left breast, not painful.        HPI  Ms. Lamar comes in for breast exam. Had gone for mammogram and was told she would need diagnostic. She has had a nontender small lump in lower aspect of L breast since December. Has not changed. She did not think it was anything to be concerned about. No nipple discharge. No R breast issues.       Review of Systems  Review of Systems   Genitourinary: Positive for breast lump. Negative for breast discharge and breast pain.        Current Medications  Current Outpatient Medications on File Prior to Visit   Medication Sig Dispense Refill   • norgestimate-ethinyl estradiol (Sprintec 28) 0.25-35 MG-MCG per tablet Take 1 tablet by mouth Daily. 28 tablet 11     No current facility-administered medications on file prior to visit.       Allergies  Allergies   Allergen Reactions   • Penicillins Angioedema       Objective  Visit Vitals  /82   Pulse 82   Temp 98 °F (36.7 °C)   Ht 167.6 cm (66\")   Wt 61.3 kg (135 lb 3.2 oz)   LMP 02/17/2023 (Approximate)   SpO2 98% Comment: ra   BMI 21.82 kg/m²        Physical Exam  Physical Exam  Vitals and nursing note reviewed. Exam conducted with a chaperone present.   Constitutional:       General: She is not in acute distress.     Appearance: Normal appearance. She is well-developed and normal weight. She is not ill-appearing or toxic-appearing.   HENT:      Head: Normocephalic and atraumatic.   Eyes:      Conjunctiva/sclera: Conjunctivae normal.   Pulmonary:      Effort: Pulmonary effort is normal. No respiratory distress.   Chest:   Breasts:     Right: Mass (indistinct area of small lumps at 6 oclock) present. No nipple discharge, skin change or tenderness.      Left: Mass (indistinct area of small lumps " at 6 oclock) present. No nipple discharge, skin change or tenderness.       Skin:     General: Skin is warm and dry.   Neurological:      Mental Status: She is alert and oriented to person, place, and time. Mental status is at baseline.         Results  Results for orders placed or performed in visit on 10/28/22   CBC (No Diff)    Specimen: Blood   Result Value Ref Range    WBC 7.36 3.40 - 10.80 10*3/mm3    RBC 4.57 3.77 - 5.28 10*6/mm3    Hemoglobin 14.3 12.0 - 15.9 g/dL    Hematocrit 41.0 34.0 - 46.6 %    MCV 89.7 79.0 - 97.0 fL    MCH 31.3 26.6 - 33.0 pg    MCHC 34.9 31.5 - 35.7 g/dL    RDW 12.2 (L) 12.3 - 15.4 %    RDW-SD 40.4 37.0 - 54.0 fl    MPV 10.3 6.0 - 12.0 fL    Platelets 306 140 - 450 10*3/mm3   Comprehensive Metabolic Panel    Specimen: Blood   Result Value Ref Range    Glucose 73 65 - 99 mg/dL    BUN 11 6 - 20 mg/dL    Creatinine 0.83 0.57 - 1.00 mg/dL    Sodium 138 136 - 145 mmol/L    Potassium 4.5 3.5 - 5.2 mmol/L    Chloride 102 98 - 107 mmol/L    CO2 24.7 22.0 - 29.0 mmol/L    Calcium 9.3 8.6 - 10.5 mg/dL    Total Protein 7.7 6.0 - 8.5 g/dL    Albumin 4.10 3.50 - 5.20 g/dL    ALT (SGPT) 22 1 - 33 U/L    AST (SGOT) 23 1 - 32 U/L    Alkaline Phosphatase 49 39 - 117 U/L    Total Bilirubin 0.7 0.0 - 1.2 mg/dL    Globulin 3.6 gm/dL    A/G Ratio 1.1 g/dL    BUN/Creatinine Ratio 13.3 7.0 - 25.0    Anion Gap 11.3 5.0 - 15.0 mmol/L    eGFR 87.6 >60.0 mL/min/1.73   Lipid Panel    Specimen: Blood   Result Value Ref Range    Total Cholesterol 236 (H) 0 - 200 mg/dL    Triglycerides 61 0 - 150 mg/dL    HDL Cholesterol 70 (H) 40 - 60 mg/dL    LDL Cholesterol  156 (H) 0 - 100 mg/dL    VLDL Cholesterol 10 5 - 40 mg/dL    LDL/HDL Ratio 2.20    Hemoglobin A1c    Specimen: Blood   Result Value Ref Range    Hemoglobin A1C 5.40 4.80 - 5.60 %        Assessment and Plan  Diagnoses and all orders for this visit:    Mass overlapping multiple quadrants of left breast  Mass overlapping multiple quadrants of right breast  -  has felt mass in L lower breast since December, not changing. Similar mass felt in R breast. Likely fibroglandular tissue.  - will proceed with diagnostic mammogram of both breasts     Health Maintenance  - Pap smear: due next year  - Mammogram: 2/2022 BIRADS 1, ordered today  - Colonoscopy: cologuard negative 2020, due 10/2023  - HCV: negative  - Immunizations: flu UTD. COVID UTD. Tdap 10/2020.  - Depression screening: negative 10/2022    Return for Next scheduled follow up.  Answers for HPI/ROS submitted by the patient on 3/3/2023  Please describe your symptoms.: Breast issue (bumpy bit found in self exam)  Have you had these symptoms before?: No  How long have you been having these symptoms?: Greater than 2 weeks  Please describe any probable cause for these symptoms. : Menopause coming up?  What is the primary reason for your visit?: Other

## 2023-04-14 ENCOUNTER — HOSPITAL ENCOUNTER (OUTPATIENT)
Dept: MAMMOGRAPHY | Facility: HOSPITAL | Age: 48
Discharge: HOME | End: 2023-04-14
Payer: COMMERCIAL

## 2023-04-14 ENCOUNTER — HOSPITAL ENCOUNTER (OUTPATIENT)
Dept: ULTRASOUND IMAGING | Facility: HOSPITAL | Age: 48
Discharge: HOME | End: 2023-04-14
Payer: COMMERCIAL

## 2023-04-14 DIAGNOSIS — N63.25 MASS OVERLAPPING MULTIPLE QUADRANTS OF LEFT BREAST: ICD-10-CM

## 2023-04-14 DIAGNOSIS — N63.15 MASS OVERLAPPING MULTIPLE QUADRANTS OF RIGHT BREAST: ICD-10-CM

## 2023-04-14 PROCEDURE — G0279 TOMOSYNTHESIS, MAMMO: HCPCS

## 2023-04-14 PROCEDURE — 77062 BREAST TOMOSYNTHESIS BI: CPT | Performed by: RADIOLOGY

## 2023-04-14 PROCEDURE — 77066 DX MAMMO INCL CAD BI: CPT | Performed by: RADIOLOGY

## 2023-04-14 PROCEDURE — 76642 ULTRASOUND BREAST LIMITED: CPT

## 2023-04-14 PROCEDURE — 76642 ULTRASOUND BREAST LIMITED: CPT | Performed by: RADIOLOGY

## 2023-04-14 PROCEDURE — 77066 DX MAMMO INCL CAD BI: CPT

## 2023-10-18 NOTE — TELEPHONE ENCOUNTER
Spoke with alanna and she stated that she seen new order for x-ray once the pt was gone so she called her and she is going to go tomorrow to get that done.    Clofazimine Counseling:  I discussed with the patient the risks of clofazimine including but not limited to skin and eye pigmentation, liver damage, nausea/vomiting, gastrointestinal bleeding and allergy.

## 2023-11-14 ENCOUNTER — OFFICE VISIT (OUTPATIENT)
Dept: INTERNAL MEDICINE | Facility: CLINIC | Age: 48
End: 2023-11-14
Payer: COMMERCIAL

## 2023-11-14 ENCOUNTER — LAB (OUTPATIENT)
Dept: LAB | Facility: HOSPITAL | Age: 48
End: 2023-11-14
Payer: COMMERCIAL

## 2023-11-14 VITALS
DIASTOLIC BLOOD PRESSURE: 74 MMHG | TEMPERATURE: 97.1 F | HEIGHT: 66 IN | HEART RATE: 72 BPM | BODY MASS INDEX: 20.89 KG/M2 | WEIGHT: 130 LBS | SYSTOLIC BLOOD PRESSURE: 120 MMHG | OXYGEN SATURATION: 99 %

## 2023-11-14 DIAGNOSIS — Z00.00 ANNUAL PHYSICAL EXAM: Primary | ICD-10-CM

## 2023-11-14 DIAGNOSIS — E55.9 VITAMIN D DEFICIENCY: ICD-10-CM

## 2023-11-14 DIAGNOSIS — Z00.00 ANNUAL PHYSICAL EXAM: ICD-10-CM

## 2023-11-14 DIAGNOSIS — E78.00 HYPERCHOLESTEROLEMIA: ICD-10-CM

## 2023-11-14 DIAGNOSIS — Z12.11 SCREENING FOR MALIGNANT NEOPLASM OF COLON: ICD-10-CM

## 2023-11-14 DIAGNOSIS — Z30.41 ENCOUNTER FOR BIRTH CONTROL PILLS MAINTENANCE: ICD-10-CM

## 2023-11-14 DIAGNOSIS — Z12.4 SCREENING FOR CERVICAL CANCER: ICD-10-CM

## 2023-11-14 LAB
25(OH)D3 SERPL-MCNC: 40.6 NG/ML (ref 30–100)
ALBUMIN SERPL-MCNC: 4.3 G/DL (ref 3.5–5.2)
ALBUMIN/GLOB SERPL: 1.4 G/DL
ALP SERPL-CCNC: 40 U/L (ref 39–117)
ALT SERPL W P-5'-P-CCNC: 14 U/L (ref 1–33)
ANION GAP SERPL CALCULATED.3IONS-SCNC: 8.9 MMOL/L (ref 5–15)
AST SERPL-CCNC: 16 U/L (ref 1–32)
BILIRUB SERPL-MCNC: 0.5 MG/DL (ref 0–1.2)
BUN SERPL-MCNC: 13 MG/DL (ref 6–20)
BUN/CREAT SERPL: 16.3 (ref 7–25)
CALCIUM SPEC-SCNC: 9 MG/DL (ref 8.6–10.5)
CHLORIDE SERPL-SCNC: 102 MMOL/L (ref 98–107)
CHOLEST SERPL-MCNC: 218 MG/DL (ref 0–200)
CO2 SERPL-SCNC: 24.1 MMOL/L (ref 22–29)
CREAT SERPL-MCNC: 0.8 MG/DL (ref 0.57–1)
DEPRECATED RDW RBC AUTO: 39.9 FL (ref 37–54)
EGFRCR SERPLBLD CKD-EPI 2021: 91 ML/MIN/1.73
ERYTHROCYTE [DISTWIDTH] IN BLOOD BY AUTOMATED COUNT: 12.4 % (ref 12.3–15.4)
GLOBULIN UR ELPH-MCNC: 3.1 GM/DL
GLUCOSE SERPL-MCNC: 91 MG/DL (ref 65–99)
HBA1C MFR BLD: 5.6 % (ref 4.8–5.6)
HCT VFR BLD AUTO: 38.9 % (ref 34–46.6)
HDLC SERPL-MCNC: 71 MG/DL (ref 40–60)
HGB BLD-MCNC: 13.5 G/DL (ref 12–15.9)
LDLC SERPL CALC-MCNC: 131 MG/DL (ref 0–100)
LDLC/HDLC SERPL: 1.82 {RATIO}
MCH RBC QN AUTO: 30.5 PG (ref 26.6–33)
MCHC RBC AUTO-ENTMCNC: 34.7 G/DL (ref 31.5–35.7)
MCV RBC AUTO: 88 FL (ref 79–97)
PLATELET # BLD AUTO: 373 10*3/MM3 (ref 140–450)
PMV BLD AUTO: 10.4 FL (ref 6–12)
POTASSIUM SERPL-SCNC: 4.2 MMOL/L (ref 3.5–5.2)
PROT SERPL-MCNC: 7.4 G/DL (ref 6–8.5)
RBC # BLD AUTO: 4.42 10*6/MM3 (ref 3.77–5.28)
SODIUM SERPL-SCNC: 135 MMOL/L (ref 136–145)
TRIGL SERPL-MCNC: 90 MG/DL (ref 0–150)
VLDLC SERPL-MCNC: 16 MG/DL (ref 5–40)
WBC NRBC COR # BLD: 7.11 10*3/MM3 (ref 3.4–10.8)

## 2023-11-14 PROCEDURE — 83036 HEMOGLOBIN GLYCOSYLATED A1C: CPT

## 2023-11-14 PROCEDURE — 80053 COMPREHEN METABOLIC PANEL: CPT

## 2023-11-14 PROCEDURE — 80061 LIPID PANEL: CPT

## 2023-11-14 PROCEDURE — 82306 VITAMIN D 25 HYDROXY: CPT

## 2023-11-14 PROCEDURE — 85027 COMPLETE CBC AUTOMATED: CPT

## 2023-11-14 NOTE — PROGRESS NOTES
Internal Medicine Annual Exam  Margot Lamar is a 48 y.o. female who presents today for an annual exam and with concerns as outlined below.    Chief Complaint  Chief Complaint   Patient presents with    Annual Exam    Gynecologic Exam        HPI  Ms. Lamar comes in today for her physical. She remains generally healthy. She is on OCP for contraception which she wishes to continue. She has had regular menstrual cycles but they are becoming much lighter. She continues to be active, runs regularly. She is eating mostly home cooked foods and trying to make healthy dietary choices. She is up to date with mammogram. She had a diagnostic mammogram in April and was told the lumps she felt were just dense breast tissue. She will have pap smear today. No issues or new sexual partners. She is due to repeat cologuard. She has already had a flu shot. She is considering the COVID booster but notes that the last two shots have made her ill. Denies use of tobacco, ecigarettes, illicit drugs, and drinks minimal alcohol.         Review of Systems  Review of Systems   HENT:  Positive for hearing loss (mild).    All other systems reviewed and are negative.       Past Medical History  No past medical history on file.     Surgical History  No past surgical history on file.     Family History  Family History   Problem Relation Age of Onset    Parkinsonism Mother         Progressive supranuclear palsy    Stroke Father     Diabetes Half-Sister     Arrhythmia Brother         benign, since teenage years    Heart disease Maternal Grandfather     Heart attack Maternal Grandfather     Early death Maternal Grandfather     No Known Problems Sister     Breast cancer Neg Hx     Ovarian cancer Neg Hx         Social History  Social History     Socioeconomic History    Marital status:    Tobacco Use    Smoking status: Never    Smokeless tobacco: Never   Vaping Use    Vaping Use: Never used   Substance and Sexual Activity    Alcohol  "use: Yes     Alcohol/week: 1.0 standard drink of alcohol     Types: 1 Glasses of wine per week     Comment: 2 drinks per month    Drug use: No    Sexual activity: Yes     Partners: Male     Birth control/protection: Pill     Comment: Only with         Current Medications  Current Outpatient Medications on File Prior to Visit   Medication Sig Dispense Refill    norgestimate-ethinyl estradiol (Sprintec 28) 0.25-35 MG-MCG per tablet Take 1 tablet by mouth Daily. 28 tablet 11     No current facility-administered medications on file prior to visit.       Allergies  Allergies   Allergen Reactions    Penicillins Angioedema        Objective  Visit Vitals  /74   Pulse 72   Temp 97.1 °F (36.2 °C)   Ht 167.6 cm (66\")   Wt 59 kg (130 lb)   SpO2 99%   BMI 20.98 kg/m²        Physical Exam  Physical Exam  Vitals and nursing note reviewed. Exam conducted with a chaperone present.   Constitutional:       General: She is not in acute distress.     Appearance: Normal appearance. She is well-developed and normal weight. She is not ill-appearing, toxic-appearing or diaphoretic.   HENT:      Head: Normocephalic and atraumatic.      Right Ear: Tympanic membrane, ear canal and external ear normal.      Left Ear: Tympanic membrane, ear canal and external ear normal.      Nose: Nose normal.   Eyes:      General: No scleral icterus.     Conjunctiva/sclera: Conjunctivae normal.   Cardiovascular:      Rate and Rhythm: Normal rate and regular rhythm.      Heart sounds: Normal heart sounds. No murmur heard.  Pulmonary:      Effort: Pulmonary effort is normal. No respiratory distress.      Breath sounds: Normal breath sounds.   Chest:      Comments: Breast exam declined.  Abdominal:      General: There is no distension.      Palpations: Abdomen is soft. There is no mass.      Tenderness: There is no abdominal tenderness.   Genitourinary:     General: Normal vulva.      Exam position: Lithotomy position.      Vagina: Normal.      " Cervix: Cervical bleeding (after use of pap brush) present. No discharge, lesion or erythema.      Comments: Declined bimanual exam.  Musculoskeletal:         General: No deformity.      Cervical back: Neck supple.      Right lower leg: No edema.      Left lower leg: No edema.   Lymphadenopathy:      Cervical: No cervical adenopathy.   Skin:     General: Skin is warm and dry.      Findings: No rash.   Neurological:      Mental Status: She is alert and oriented to person, place, and time. Mental status is at baseline.      Gait: Gait normal.   Psychiatric:         Mood and Affect: Mood normal.         Behavior: Behavior normal.         Thought Content: Thought content normal.         Judgment: Judgment normal.          Results  Results for orders placed or performed in visit on 10/28/22   CBC (No Diff)    Specimen: Blood   Result Value Ref Range    WBC 7.36 3.40 - 10.80 10*3/mm3    RBC 4.57 3.77 - 5.28 10*6/mm3    Hemoglobin 14.3 12.0 - 15.9 g/dL    Hematocrit 41.0 34.0 - 46.6 %    MCV 89.7 79.0 - 97.0 fL    MCH 31.3 26.6 - 33.0 pg    MCHC 34.9 31.5 - 35.7 g/dL    RDW 12.2 (L) 12.3 - 15.4 %    RDW-SD 40.4 37.0 - 54.0 fl    MPV 10.3 6.0 - 12.0 fL    Platelets 306 140 - 450 10*3/mm3   Comprehensive Metabolic Panel    Specimen: Blood   Result Value Ref Range    Glucose 73 65 - 99 mg/dL    BUN 11 6 - 20 mg/dL    Creatinine 0.83 0.57 - 1.00 mg/dL    Sodium 138 136 - 145 mmol/L    Potassium 4.5 3.5 - 5.2 mmol/L    Chloride 102 98 - 107 mmol/L    CO2 24.7 22.0 - 29.0 mmol/L    Calcium 9.3 8.6 - 10.5 mg/dL    Total Protein 7.7 6.0 - 8.5 g/dL    Albumin 4.10 3.50 - 5.20 g/dL    ALT (SGPT) 22 1 - 33 U/L    AST (SGOT) 23 1 - 32 U/L    Alkaline Phosphatase 49 39 - 117 U/L    Total Bilirubin 0.7 0.0 - 1.2 mg/dL    Globulin 3.6 gm/dL    A/G Ratio 1.1 g/dL    BUN/Creatinine Ratio 13.3 7.0 - 25.0    Anion Gap 11.3 5.0 - 15.0 mmol/L    eGFR 87.6 >60.0 mL/min/1.73   Lipid Panel    Specimen: Blood   Result Value Ref Range    Total  Cholesterol 236 (H) 0 - 200 mg/dL    Triglycerides 61 0 - 150 mg/dL    HDL Cholesterol 70 (H) 40 - 60 mg/dL    LDL Cholesterol  156 (H) 0 - 100 mg/dL    VLDL Cholesterol 10 5 - 40 mg/dL    LDL/HDL Ratio 2.20    Hemoglobin A1c    Specimen: Blood   Result Value Ref Range    Hemoglobin A1C 5.40 4.80 - 5.60 %        Assessment and Plan  Diagnoses and all orders for this visit:    Annual physical exam  - Counseling was given to patient for the following topics:  appropriate exercise, healthy eating habits, disease prevention, importance of self breast exam and breast health, importance of immunizations, including risks and benefits, and bone health. Also discussed the importance of regular dental and vision care.  -     CBC (No Diff); Future  -     Comprehensive Metabolic Panel; Future  -     Hemoglobin A1c; Future    Hypercholesterolemia  - active lifestyle and healthy diet  - will update lipid panel    Vitamin D deficiency  - not supplementing, will update level    Encounter for birth control pills maintenance   - continue sprintec, remains appropriate for OCP     Screening for malignant neoplasm of colon  -     Cologuard - Stool, Per Rectum; Future    Screening for cervical cancer  -     LIQUID-BASED PAP SMEAR WITH HPV GENOTYPING REGARDLESS OF INTERPRETATION ([facility]); Future  -     LIQUID-BASED PAP SMEAR WITH HPV GENOTYPING REGARDLESS OF INTERPRETATION ([facility])      Health Maintenance   Topic Date Due    COVID-19 Vaccine (6 - 2023-24 season) 09/01/2023    PAP SMEAR  10/12/2023    COLORECTAL CANCER SCREENING  10/27/2023    ANNUAL PHYSICAL  10/28/2023    LIPID PANEL  10/28/2023    MAMMOGRAM  04/14/2024    TDAP/TD VACCINES (3 - Td or Tdap) 10/20/2030    HEPATITIS C SCREENING  Completed    INFLUENZA VACCINE  Completed    Pneumococcal Vaccine 0-64  Aged Out     Health Maintenance  - Pap smear: today  - Mammogram: 4/2023 BIRADS 1  - Colonoscopy: cologuard negative 2020, ordered  - HCV: negative  - Immunizations:  flu UTD. COVID discussed. Tdap 10/2020.  - Depression screening: negative 11/2023    Return in about 1 year (around 11/14/2024) for Annual, Labs today.

## 2023-11-17 DIAGNOSIS — Z30.41 ENCOUNTER FOR BIRTH CONTROL PILLS MAINTENANCE: ICD-10-CM

## 2023-11-17 RX ORDER — NORGESTIMATE AND ETHINYL ESTRADIOL 0.25-0.035
1 KIT ORAL DAILY
Qty: 28 TABLET | Refills: 11 | Status: SHIPPED | OUTPATIENT
Start: 2023-11-17

## 2023-11-22 LAB — REF LAB TEST METHOD: NORMAL

## 2023-12-28 ENCOUNTER — HOSPITAL ENCOUNTER (OUTPATIENT)
Dept: GENERAL RADIOLOGY | Facility: HOSPITAL | Age: 48
Discharge: HOME | End: 2023-12-28
Admitting: NURSE PRACTITIONER
Payer: COMMERCIAL

## 2023-12-28 ENCOUNTER — OFFICE VISIT (OUTPATIENT)
Dept: INTERNAL MEDICINE | Facility: CLINIC | Age: 48
End: 2023-12-28
Payer: COMMERCIAL

## 2023-12-28 VITALS
SYSTOLIC BLOOD PRESSURE: 102 MMHG | OXYGEN SATURATION: 98 % | DIASTOLIC BLOOD PRESSURE: 70 MMHG | TEMPERATURE: 97.2 F | WEIGHT: 128 LBS | HEIGHT: 66 IN | HEART RATE: 90 BPM | BODY MASS INDEX: 20.57 KG/M2

## 2023-12-28 DIAGNOSIS — S59.901A INJURY OF RIGHT ELBOW, INITIAL ENCOUNTER: ICD-10-CM
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
## 2023-12-28 DIAGNOSIS — S89.91XA INJURY OF RIGHT KNEE, INITIAL ENCOUNTER: ICD-10-CM

## 2023-12-28 DIAGNOSIS — M25.521 RIGHT ELBOW PAIN: ICD-10-CM

## 2023-12-28 DIAGNOSIS — M25.561 ACUTE PAIN OF RIGHT KNEE: ICD-10-CM

## 2023-12-28 PROCEDURE — 73560 X-RAY EXAM OF KNEE 1 OR 2: CPT

## 2023-12-28 PROCEDURE — 73080 X-RAY EXAM OF ELBOW: CPT

## 2023-12-28 NOTE — PROGRESS NOTES
Office Note     Name: Margot Lamar    : 1975     MRN: [id number]     Chief Complaint  Fall (Fell on  ), Knee Pain (Right knee), and Elbow Pain (Right elbow )    Subjective     History of Present Illness:  Margot Lamar is a 48 y.o. female who presents today for evaluation of knee and elbow pain.  Patient follows with Dr. Mooney for chronic conditions.  Patient was out of this state visiting family for [place] and fell on .  She reports she was running and slipped and fell on the tile floor.  She fell on her right side.  She has since been experiencing pain to her right elbow and right knee.  She is unable to fully extend her right arm due to pain and discomfort.  She also notes limited range of motion to her right upper extremity.  She is also complained of pain to her right knee.  Pain is present mainly with movement, ambulation, and weightbearing.  Patient does note that the pain is improving since the initial injury.  She reports no pain while sitting.  The pain is mainly to the medial aspect of the knee area.  She did buy an over-the-counter compression sleeve which was helpful in stabilizing and supporting her knee.  She has also used a sling for her arm as needed.  She has tried to be slow and cautious with ambulation.  She is also been limping some to reduce weightbearing and stress on her right knee.  She did not seek evaluation immediately after her injury.  She presents today for evaluation of the above acute complaints.  No further complaints or concerns at this time.  Pleasant visit with patient today.    Review of Systems   Musculoskeletal:  Positive for arthralgias, gait problem and joint swelling.        History reviewed. No pertinent past medical history.    No past surgical history on file.    Social History     Socioeconomic History    Marital status:    Tobacco Use    Smoking status: Never    Smokeless tobacco: Never   Vaping Use    Vaping Use:  "Never used   Substance and Sexual Activity    Alcohol use: Yes     Alcohol/week: 1.0 standard drink of alcohol     Types: 1 Glasses of wine per week     Comment: 2 drinks per month    Drug use: No    Sexual activity: Yes     Partners: Male     Birth control/protection: Pill     Comment: Only with          Current Outpatient Medications:     norgestimate-ethinyl estradiol (Sprintec 28) 0.25-35 MG-MCG per tablet, Take 1 tablet by mouth Daily., Disp: 28 tablet, Rfl: 11    Objective     Vital Signs  /70   Pulse 90   Temp 97.2 °F (36.2 °C)   Ht 167.6 cm (65.98\")   Wt 58.1 kg (128 lb)   SpO2 98%   BMI 20.67 kg/m²   Estimated body mass index is 20.67 kg/m² as calculated from the following:    Height as of this encounter: 167.6 cm (65.98\").    Weight as of this encounter: 58.1 kg (128 lb).    BMI is within normal parameters. No other follow-up for BMI required.      Physical Exam  Constitutional:       General: She is not in acute distress.     Appearance: Normal appearance. She is not ill-appearing.   HENT:      Head: Normocephalic and atraumatic.      Nose: Nose normal.   Eyes:      Extraocular Movements: Extraocular movements intact.      Conjunctiva/sclera: Conjunctivae normal.      Pupils: Pupils are equal, round, and reactive to light.   Cardiovascular:      Rate and Rhythm: Normal rate.   Pulmonary:      Effort: Pulmonary effort is normal. No respiratory distress.   Musculoskeletal:         General: Swelling and tenderness present. Normal range of motion.      Cervical back: Neck supple.      Comments: Decreased range of motion to right upper extremity and right lower extremity.  Swelling and tenderness to palpation to right elbow area.  No swelling or tenderness to palpation noted to right knee.  Gait slow and cautious.   Skin:     General: Skin is warm and dry.   Neurological:      General: No focal deficit present.      Mental Status: She is alert and oriented to person, place, and time. Mental " status is at baseline.   Psychiatric:         Mood and Affect: Mood normal.         Behavior: Behavior normal.         Thought Content: Thought content normal.         Judgment: Judgment normal.          Assessment and Plan     Diagnoses and all orders for this visit:    1. Fall, initial encounter (Primary)    2. Right elbow pain  -     XR Elbow 2 View Right (In Office)    3. Injury of right elbow, initial encounter  -     XR Elbow 2 View Right (In Office)    4. Acute pain of right knee  -     XR Knee 3 View Right    5. Injury of right knee, initial encounter  -     XR Knee 3 View Right        Follow Up  Return if symptoms worsen or fail to improve, for Follow up with Dr. Mooney.    LUCINDA Stuart    Part of this note may be an electronic transcription/translation of spoken language to printed text using the Dragon Dictation System.  Answers submitted by the patient for this visit:  Primary Reason for Visit (Submitted on 12/27/2023)  What is the primary reason for your visit?: Lower Extremity Injury  Lower Extremity Injury Questionnaire (Submitted on 12/27/2023)  Chief Complaint: Lower extremity pain  Incident occurred: 5 to 7 days ago  Incident location: at home  Injury mechanism: a fall  Pain location: right leg, right knee  Pain quality: aching, stabbing  Pain - numeric: 1/10  Pain course: intermittent  tingling: No  inability to bear weight: No  loss of motion: Yes  loss of sensation: No  muscle weakness: No  Foreign body present: no foreign bodies  Aggravated by: movement

## 2023-12-29 ENCOUNTER — TELEPHONE (OUTPATIENT)
Dept: INTERNAL MEDICINE | Facility: CLINIC | Age: 48
End: 2023-12-29
Payer: COMMERCIAL

## 2023-12-29 NOTE — TELEPHONE ENCOUNTER
Caller: Margot Lamar    Relationship: Self    Best call back number: [phone]     What is the best time to reach you: ANY    Who are you requesting to speak with (clinical staff, provider,  specific staff member): DR. CHILDRESS OR HER NURSE    What was the call regarding: THE PATIENT IS CALLING BACK TO CHECK ON THIS SINCE SHE HAS NOT GOTTEN A CALL. PLEASE CALL HER ASAP. SHE IS WORRIED WITH THE LONG WEEKEND COMING UP THAT SHE WILL NOT KNOW WHAT TO DO WITH THE FRACTURE ON HER ELBOW. PLEASE ADVISE.    Is it okay if the provider responds through MyChart: NO

## 2023-12-29 NOTE — TELEPHONE ENCOUNTER
PATIENT CALLED AND STATES SHE RECEIVED AN XRAY 12/28 AND THE RESULTS WHERE SHE HAS A FRACTURED ELBOW PATIENT STATES SHE WOULD LIKE TO KNOW WHAT SHE NEEDS TO DO NEXT.  PATIENTS CALL BACK NUMBER [phone]

## 2024-01-02 ENCOUNTER — OFFICE VISIT (OUTPATIENT)
Age: 49
End: 2024-01-02
Payer: COMMERCIAL

## 2024-01-02 VITALS
SYSTOLIC BLOOD PRESSURE: 110 MMHG | WEIGHT: 125.3 LBS | BODY MASS INDEX: 20.88 KG/M2 | HEIGHT: 65 IN | DIASTOLIC BLOOD PRESSURE: 72 MMHG

## 2024-01-02 DIAGNOSIS — S52.124A CLOSED NONDISPLACED FRACTURE OF HEAD OF RIGHT RADIUS, INITIAL ENCOUNTER: Primary | ICD-10-CM

## 2024-01-02 DIAGNOSIS — S52.121A CLOSED DISPLACED FRACTURE OF HEAD OF RIGHT RADIUS, INITIAL ENCOUNTER: Primary | ICD-10-CM

## 2024-01-02 PROCEDURE — 99204 OFFICE O/P NEW MOD 45 MIN: CPT | Performed by: STUDENT IN AN ORGANIZED HEALTH CARE EDUCATION/TRAINING PROGRAM

## 2024-01-02 NOTE — PROGRESS NOTES
[facility]     Office Visit       Date: 01/02/2024   Patient Name: Margot Lamar  MRN: [id number]  YOB: 1975    Referring Physician: Alia De Jesus APRN     Chief Complaint:   Chief Complaint   Patient presents with    Right Elbow - Pain     DOI: 12/21/23     History of Present Illness:   Margot Lamar is a 48 y.o. female right-hand-dominant presented to clinic with complaints of right elbow pain.  Patient sustained a mechanical fall from  her kitchen, DOI 12/21/2023.  She reports pain into the lateral aspect of her elbow.  There is no pain at rest, however pain is noted with range of motion.  She has been using a sling which has improved her pain.  Pain and swelling have also somewhat improved with time.  She presented to her primary care physician 1 week after injury for evaluation due to continued pain.  She also injured her right knee during that time.  Right knee pain has completely resolved.  Otherwise no other complaints or concerns.    Subjective   Review of Systems:   Review of Systems   Constitutional: Negative.    HENT: Negative.     Eyes: Negative.    Respiratory: Negative.     Cardiovascular: Negative.    Gastrointestinal: Negative.    Endocrine: Negative.    Genitourinary: Negative.    Musculoskeletal:  Positive for arthralgias.   Skin: Negative.    Allergic/Immunologic: Negative.    Neurological: Negative.    Hematological: Negative.    Psychiatric/Behavioral: Negative.          Pertinent review of systems per HPI    I reviewed the patient's chief complaint, history of present illness, review of systems, past medical history, surgical history, family history, social history, medications and allergy list in the EMR on 01/02/2024 and agree with the findings above.    Objective    Quality Measures:   ACP:   ACP discussion was declined by the patient.      Tobacco:   Margot Lamar   "reports that she has never smoked. She has never used smokeless tobacco..     Vital Signs:   Vitals:    01/02/24 1337   BP: 110/72   Weight: 56.8 kg (125 lb 4.8 oz)   Height: 165.1 cm (65\")     BMI: BMI is within normal parameters. No other follow-up for BMI required.     General: No acute distress. Alert and oriented.     Ortho Exam:  Examination of the right upper extremity demonstrates no deformity.  No skin lesions or abrasions.  Subtle swelling noted along the posterior lateral aspect of the elbow.  Range of motion with flexion extension is limited secondary to pain.  Patient achieves full pronation and supination with no mechanical blocks to pronosupination.  She is nontender along the extensor mass and is mildly tender at the radiocapitellar joint.  Sensations intact light touch throughout the hand.  Warm and well-perfused distally.    Imaging / Studies:    Imaging Results (Last 24 Hours)       ** No results found for the last 24 hours. **        Right elbow x-rays obtained on 12/28/2023 were personally reviewed and interpreted by myself.  These demonstrate an intra-articular radial head fracture with displacement of approximately 1 to 2 mm.    Assessment / Plan    Assessment/Plan:   Margot Lamar is a 48 y.o. female with a right intra-articular radial head fracture, DOI 12/21/2023.    I discussed with the patient their clinical and radiographic findings demonstrate a minimally displaced right radial head fracture.  We had a lengthy discussion regarding the pathophysiology of their diagnosis.  I reviewed with the patient her radiographs and the relevant anatomy.  Her fracture does have intra-articular extension noted on the radiographs with 1 to 2 mm of displacement.  However her physical exam demonstrates no mechanical block to pronation or supination. Given her full forearm rotation, we can treat her fracture nonoperatively.  I would however like to start her with physical therapy to improve her " range of motion.  The patient expressed understanding.  I would like to see her back in 1 month for reevaluation of her elbow range of motion.  All questions and concerns were addressed.  They were agreeable with the plan.        ICD-10-CM ICD-9-CM   1. Closed displaced fracture of head of right radius, initial encounter  S52.121A 813.05     Follow Up:   Return in about 4 weeks (around 1/30/2024) for Follow Up range of motion check.      Felicitas Bush MD  [facility]

## 2024-01-04 ENCOUNTER — TREATMENT (OUTPATIENT)
Dept: PHYSICAL THERAPY | Facility: CLINIC | Age: 49
End: 2024-01-04
Payer: COMMERCIAL

## 2024-01-04 DIAGNOSIS — M25.621 DECREASED RANGE OF MOTION OF RIGHT ELBOW: ICD-10-CM

## 2024-01-04 DIAGNOSIS — S52.121A CLOSED DISPLACED FRACTURE OF HEAD OF RIGHT RADIUS, INITIAL ENCOUNTER: Primary | ICD-10-CM

## 2024-01-04 DIAGNOSIS — R29.898 DECREASED GRIP STRENGTH OF RIGHT HAND: ICD-10-CM

## 2024-01-04 NOTE — PROGRESS NOTES
Physical Therapy Initial Evaluation and Plan of Care    [facility]    [address], [address] [place] [address]    Patient: Margot Lamar   : 1975  Diagnosis/ICD-10 Code:  Closed displaced fracture of head of right radius, initial encounter [S52.121A]  Referring practitioner: Felicitas Bush MD  Date of Initial Visit: 2024  Today's Date: 2024  Patient seen for 1 session         Visit Diagnoses:    ICD-10-CM ICD-9-CM   1. Closed displaced fracture of head of right radius, initial encounter  S52.121A 813.05   2. Decreased range of motion of right elbow  M25.621 719.52   3. Decreased  strength of right hand  R29.898 729.89         Subjective Questionnaire: QuickDASH: 61%      Subjective Evaluation    History of Present Illness  Date of onset: 2023  Mechanism of injury: Margot Lamar presents to the physical therapy clinic with complaints of right elbow following mechanical fall forward with direct pressure on right elbow. Reported mechanism of injury is forward fall with direct pressure on radial head/right knee; no issues with right knee. To date patient has experienced slightly improved pain levels and functional ability. Specifically, She reports having difficulty with pain-free ADLs/IADLs, recreational activity and AROM. Other specific details include: overall limited ROM above and below injury site and mild visual swelling noted at elbow joint compared bilaterally.    Significant past medical history includes: fractured right kneecap multiple years ago; no other significant noted. Complicating factors to physical therapy course of care include: none.      Currently, they are not receiving assistance at home for ADLs. She lives with their spouse and two sons. They would like to return to recreational running/walking, ADLs/IADLs pain free and full AROM. Further psychosocial details include: none.       Subjective comment: Right Elbow  Patient Occupation:  Unemployed - Homemaker Quality of life: excellent    Pain  Current pain ratin  At best pain ratin  At worst pain ratin  Location: lateral right elbow  Quality: dull ache, pulling, tight and discomfort  Relieving factors: change in position, ice and support  Aggravating factors: outstretched reach, sleeping, keyboarding, movement and lifting  Progression: improved    Social Support  Lives in: multiple-level home    Hand dominance: right    Diagnostic Tests  X-ray: abnormal (Performed on 23; findings including joint effusion and mild comminuted and displaced intra-articular fracture of radial head on right elbow)    Treatments  Current treatment: physical therapy  Patient Goals  Patient goals for therapy: decreased edema, decreased pain, improved balance, increased strength, independence with ADLs/IADLs, return to sport/leisure activities and increased motion             Objective          Active Range of Motion   Left Shoulder   Normal active range of motion    Right Shoulder   Flexion: 130 degrees   Abduction: 100 degrees     Left Elbow   Normal active range of motion    Right Elbow   Flexion: 75 degrees with pain  Extension: Right elbow active extension: limited by 28 degrees of full extension. with pain  Forearm supination: WFL  Forearm pronation: WFL    Left Wrist   Wrist flexion: 80 degrees   Wrist extension: 77 degrees   Radial deviation: 38 degrees   Ulnar deviation: 28 degrees     Right Wrist   Wrist flexion: 40 degrees with pain  Wrist extension: 50 degrees with pain  Radial deviation: 40 degrees with pain  Ulnar deviation: 20 degrees with pain    Strength/Myotome Testing     Left Elbow   Normal strength    Right Elbow   Flexion: 4  Extension: 2+  Forearm supination: 4  Forearm pronation: 4    Left Wrist/Hand   Normal wrist strength     (2nd hand position)     Trial 1: 35 lbs    Trial 2: 30 lbs    Trial 3: 32 lbs    Average: 32.33 lbs    Right Wrist/Hand   Wrist extension: 4-  Wrist  flexion: 4-  Radial deviation: 4-  Ulnar deviation: 4-     (2nd hand position)     Trial 1: 10 lbs    Trial 2: 20 lbs    Trial 3: 15 lbs    Average: 15 lbs          Assessment & Plan       Assessment  Impairments: abnormal or restricted ROM, activity intolerance, impaired physical strength, lacks appropriate home exercise program and pain with function   Functional limitations: carrying objects, lifting, sleeping, pushing, uncomfortable because of pain, moving in bed, reaching overhead and unable to perform repetitive tasks   Assessment details: Upon initial physical therapy evaluation, the patient presents with acute right elbow pain without radiating or radicular symptoms; pain pattern is most consistent with complications and pain following right radial head fracture. Primary deficits noted during visit today include limited elbow AROM in flexion/extension, limited shoulder AROM secondary to pain and joint stiffness, generalized movement avoidance of involved limb and mild visual swelling compared to contralateral joint on medial and lateral aspect. These deficits are limiting her ability to perform ADLs/IADLs and recreational activity pain-free. Further significant findings include requiring frequent verbal cueing and encouragement to perform movement throughout session.    The patient will benefit from skilled physical therapy services to address the listed deficits for improved functional capacity and enhanced quality of life.  Prognosis: good    Goals  Plan Goals: Short Term Goals: 3 weeks  1. Pt to demonstrate independence with HEP  2. Pt to demonstrate full ROM of the right elbow and shoulder into flexion and extension  3. Pt to demonstrate ability to perform 30 min continuous activity in the clinic without exacerbation of symptoms     Long Term Goals: 6 weeks  1. Pt to demonstrate 4+/5 strength of the elbow in flexion and extension   2. Pt to report being able to use the right arm for all ADL's without  exacerbation of symptoms  3. Pt to demonstrate ability to perform 60 minutes continuous activity in the clinic without exacerbation of symptoms   4. Pt to improve by MCID of score on DASH to demonstrate decreased disability and enhanced QOL       Plan  Therapy options: will be seen for skilled therapy services  Planned modality interventions: contrast bath immersion, dry needling, electrical stimulation/Russian stimulation, thermotherapy (paraffin bath), TENS, iontophoresis, thermotherapy (hydrocollator packs) and ultrasound  Planned therapy interventions: abdominal trunk stabilization, manual therapy, motor coordination training, neuromuscular re-education, body mechanics training, soft tissue mobilization, fine motor coordination training, flexibility, spinal/joint mobilization, strengthening, stretching, functional ROM exercises, gait training, therapeutic activities, home exercise program and joint mobilization  Frequency: 2x week  Duration in weeks: 12  Treatment plan discussed with: patient      History # of Personal Factors and/or Comorbidities: LOW (0)  Examination of Body System(s): # of elements: LOW (1-2)  Clinical Presentation: STABLE   Clinical Decision Making: LOW       Timed:         Manual Therapy:         mins  26559;     Therapeutic Exercise:    15     mins  79273;     Neuromuscular Jose:    15    mins  43417;    Therapeutic Activity:          mins  59608;     Gait Training:           mins  40689;     Ultrasound:          mins  58812;    Ionto                                   mins   21902  Self Care                            mins   61933  Canalith Repos         mins 26773      Un-Timed:  Electrical Stimulation:         mins  33486 ( );  Dry Needling          mins self-pay  Traction          mins 95231  Low Eval     32     Mins  89109  Mod Eval          Mins  00868  High Eval                            Mins  66155        Timed Treatment:   30   mins   Total Treatment:     62   mins    Next  Visit:  Manual/PROM  UE gross motor activity  Ice      Visit and Documentation completed by Adin Velez PT, ELIAS   [place] License Number: [id number]    PT: Heriberto Guajardo PT, ELIAS, OCS, Cert. DN   License Number: [id number]  Electronically signed by Adin Velez PT, 01/04/24, 1:43 PM EST    Certification Period: 1/4/2024 thru 4/2/2024  I certify that the therapy services are furnished while this patient is under my care.  The services outlined above are required by this patient, and will be reviewed every 90 days.         Physician Signature:__________________________________________________    PHYSICIAN: Felicitas Bush MD  NPI: [id number]                                      DATE:      Please sign and return via fax to [email or web address] . Thank you, [facility].

## 2024-01-09 ENCOUNTER — TREATMENT (OUTPATIENT)
Dept: PHYSICAL THERAPY | Facility: CLINIC | Age: 49
End: 2024-01-09
Payer: COMMERCIAL

## 2024-01-09 DIAGNOSIS — R29.898 DECREASED GRIP STRENGTH OF RIGHT HAND: ICD-10-CM

## 2024-01-09 DIAGNOSIS — M25.621 DECREASED RANGE OF MOTION OF RIGHT ELBOW: ICD-10-CM

## 2024-01-09 DIAGNOSIS — S52.121A CLOSED DISPLACED FRACTURE OF HEAD OF RIGHT RADIUS, INITIAL ENCOUNTER: Primary | ICD-10-CM

## 2024-01-09 NOTE — PROGRESS NOTES
Physical Therapy Daily Treatment Note    [facility]   [address], [address] [place] [address]      Patient: Margot Lamar   : 1975  Referring practitioner: Felicitas Bush MD  Date of Initial Visit: Type: THERAPY  Noted: 2024  Today's Date: 2024  Patient seen for 2 sessions  Certification Period 2024 thru 2024       Visit Diagnoses:    ICD-10-CM ICD-9-CM   1. Closed displaced fracture of head of right radius, initial encounter  S52.121A 813.05   2. Decreased range of motion of right elbow  M25.621 719.52   3. Decreased  strength of right hand  R29.898 729.89       Subjective     Margot Lamar presents to the physical therapy clinic today reporting much improved symptom pattern following their previous therapy visit. They state good compliance to home interventions; states that only continues to have difficulty with extension interventions. Other reports from patient during visit today include: mild lasting pain but overall improvement in her ability to reach behind her head and increased shoulder AROM.    Objective          Active Range of Motion     Right Elbow   Extension: 25 (14 following extension mobilizations) degrees       See Exercise, Manual, and Modality Logs for complete treatment.       Assessment/Plan     During physical therapy session, patient demonstrates good progress with functional activity, pain levels and progress towards therapy goals from previous visits. Progress specifically noted during today's session includes improved global mobility of RUE and increased motion in right elbow from previous session; decreased pain through available arc of motion. Remaining deficits still noted during session today are continued apprehension to active movement in involved limb, decreased range of motion, decreased strength and pain upon palpation. Margot Lamar will continue to benefit from skilled physical therapy services to address  deficits and continue to work towards reaching functional goals. No changes to current PT POC, patient will continue AT.       Next Visit:  Re-test  strength  Re-measure ROM  Continue with MT      Timed:         Manual Therapy:    25     mins  49326;     Therapeutic Exercise:    20    mins  18232;     Neuromuscular Jose:    10    mins  27643;    Therapeutic Activity:          mins  71243;     Gait Training:           mins  78295;     Ultrasound:          mins  95244;    Ionto                                   mins   47003  Self Care                            mins   88936  Canalith Repos         mins 20879  Electrical Stimulation:         mins  80813    Un-Timed:  Electrical Stimulation:         mins  00138 ( );  Dry Needling          mins self-pay  Traction          mins 45087      Timed Treatment:   55   mins   Total Treatment:     55   mins    Visit and Documentation completed by Adin Velez PT,  ELIAS   [place] License Number: [id number]    Heriberto Guajardo PT, ELIAS, OCS, Cert. DN  [place] License: [id number]

## 2024-01-12 ENCOUNTER — TREATMENT (OUTPATIENT)
Dept: PHYSICAL THERAPY | Facility: CLINIC | Age: 49
End: 2024-01-12
Payer: COMMERCIAL

## 2024-01-12 DIAGNOSIS — M25.621 DECREASED RANGE OF MOTION OF RIGHT ELBOW: ICD-10-CM

## 2024-01-12 DIAGNOSIS — S52.121A CLOSED DISPLACED FRACTURE OF HEAD OF RIGHT RADIUS, INITIAL ENCOUNTER: Primary | ICD-10-CM

## 2024-01-12 DIAGNOSIS — R29.898 DECREASED GRIP STRENGTH OF RIGHT HAND: ICD-10-CM

## 2024-01-16 ENCOUNTER — TREATMENT (OUTPATIENT)
Dept: PHYSICAL THERAPY | Facility: CLINIC | Age: 49
End: 2024-01-16
Payer: COMMERCIAL

## 2024-01-16 DIAGNOSIS — M25.621 DECREASED RANGE OF MOTION OF RIGHT ELBOW: ICD-10-CM

## 2024-01-16 DIAGNOSIS — S52.121A CLOSED DISPLACED FRACTURE OF HEAD OF RIGHT RADIUS, INITIAL ENCOUNTER: Primary | ICD-10-CM

## 2024-01-16 DIAGNOSIS — R29.898 DECREASED GRIP STRENGTH OF RIGHT HAND: ICD-10-CM

## 2024-01-16 NOTE — PROGRESS NOTES
Physical Therapy Daily Treatment Note    [facility]   [address] [facility], [address] [place] [address]      Patient: Margot Lamar   : 1975  Referring practitioner: Felicitas Bush MD  Date of Initial Visit: Type: THERAPY  Noted: 2024  Today's Date: 2024  Patient seen for 4 sessions    Certification Period 2024 thru 2024       Visit Diagnoses:    ICD-10-CM ICD-9-CM   1. Closed displaced fracture of head of right radius, initial encounter  S52.121A 813.05   2. Decreased range of motion of right elbow  M25.621 719.52   3. Decreased  strength of right hand  R29.898 729.89       Subjective     Margot Lamar presents to the physical therapy clinic today reporting slightly better symptom pattern following their previous therapy visit. They state good compliance to home interventions; no concerns and has been doing all interventions daily. Other reports from patient during visit today include: states that she feels a little bit of soreness today due to shoveling snow at home prior to therapy session. States that she has felt that her bending has been improved and she was able touch her shoulder this morning for the first time.      Objective          Strength/Myotome Testing     Right Wrist/Hand      (2nd hand position)     Trial 1: 35 lbs    Trial 2: 38 lbs    Trial 3: 34 lbs    Average: 35.67 lbs      See Exercise, Manual, and Modality Logs for complete treatment.       Assessment/Plan     During physical therapy session, patient demonstrates good progress with functional activity, pain levels and progress towards therapy goals from previous visits. Progress specifically noted during today's session includes able to reach full elbow extension within session more consistently and improved  strength from IE. Remaining deficits still noted during session today are limited fluid movement through arc of elbow motion and limited end-range range of motion.  Margot Lamar will continue to benefit from skilled physical therapy services to address deficits and continue to work towards reaching functional goals. Changes made today were inclusion of row activity at home.      Next Visit:  Continue with elbow extension  Elbow flexion mobilization  Weight bearing activity modified       Timed:         Manual Therapy:    25     mins  83298;     Therapeutic Exercise:    15     mins  36872;     Neuromuscular Jose:    15    mins  48144;    Therapeutic Activity:          mins  50728;     Gait Training:           mins  51108;     Ultrasound:          mins  96986;    Ionto                                   mins   27584  Self Care                           mins   43365  Canalith Repos         mins 77012  Electrical Stimulation:         mins  78965    Un-Timed:  Electrical Stimulation:         mins  27784 ( );  Dry Needling          mins self-pay  Traction          mins 46909      Timed Treatment:   55   mins   Total Treatment:     55   mins    Visit and Documentation completed by Adin Velez PT,  ELIAS   [place] License Number: [id number]    Heriberto Guajardo PT, DPT, OCS, Cert. DN  [place] License: [id number]

## 2024-01-19 ENCOUNTER — TREATMENT (OUTPATIENT)
Dept: PHYSICAL THERAPY | Facility: CLINIC | Age: 49
End: 2024-01-19
Payer: COMMERCIAL

## 2024-01-19 DIAGNOSIS — M25.621 DECREASED RANGE OF MOTION OF RIGHT ELBOW: ICD-10-CM

## 2024-01-19 DIAGNOSIS — R29.898 DECREASED GRIP STRENGTH OF RIGHT HAND: ICD-10-CM

## 2024-01-19 DIAGNOSIS — S52.121A CLOSED DISPLACED FRACTURE OF HEAD OF RIGHT RADIUS, INITIAL ENCOUNTER: Primary | ICD-10-CM

## 2024-01-19 NOTE — PROGRESS NOTES
Physical Therapy Daily Treatment Note    [facility]   [address] [facility], [address] [place] [address]      Patient: Margot Lamar   : 1975  Referring practitioner: Felicitas Bush MD  Date of Initial Visit: Type: THERAPY  Noted: 2024  Today's Date: 2024  Patient seen for 5 sessions    Certification Period 2024 thru 2024       Visit Diagnoses:    ICD-10-CM ICD-9-CM   1. Closed displaced fracture of head of right radius, initial encounter  S52.121A 813.05   2. Decreased range of motion of right elbow  M25.621 719.52   3. Decreased  strength of right hand  R29.898 729.89       Subjective     Margot Lamar presents to the physical therapy clinic today reporting moderately better symptom pattern following their previous therapy visit. They state good compliance to home interventions; continues every day with all interventions. Other reports from patient during visit today include: rates 0/10 upon beginning of session today.    Objective          Active Range of Motion     Right Elbow   Flexion: 138 degrees   Extension: 0 degrees       See Exercise, Manual, and Modality Logs for complete treatment.       Assessment/Plan     During physical therapy session, patient demonstrates good progress with functional activity, pain levels and progress towards therapy goals from previous visits. Progress specifically noted during today's session includes improved AROM to end-range and performing more fluid elbow extension/flexion through arc of motion. Remaining deficits still noted during session today are pain with functional motions, weakness with elbow motions with MMT, elbow joint stiffness/swelling and continued limitations in triceps activation. Margot Lamar will continue to benefit from skilled physical therapy services to address deficits and continue to work towards reaching functional goals. No changes to current PT POC, patient will continue AT.       Next  Visit:  Modified weight bearing  Continue with ROM interventions / MT as needed  Simulate more functional motions      Timed:         Manual Therapy:    8     mins  74188;     Therapeutic Exercise:    20    mins  10440;     Neuromuscular Jose:    25   mins  97899;    Therapeutic Activity:          mins  71693;     Gait Training:           mins  10794;     Ultrasound:          mins  20283;    Ionto                                   mins   81342  Self Care                            mins   07511  Canalith Repos         mins 77856  Electrical Stimulation:         mins  92246    Un-Timed:  Electrical Stimulation:         mins  24165 ( );  Dry Needling          mins self-pay  Traction          mins 55720      Timed Treatment:   53   mins   Total Treatment:     53   mins    Visit and Documentation completed by Adin Velez PT,  ELIAS   [place] License Number: [id number]    Heriberto Guajardo PT, ELIAS, OCS, Cert. DN  [place] License: [id number]

## 2024-01-23 ENCOUNTER — TREATMENT (OUTPATIENT)
Dept: PHYSICAL THERAPY | Facility: CLINIC | Age: 49
End: 2024-01-23
Payer: COMMERCIAL

## 2024-01-23 DIAGNOSIS — S52.121A CLOSED DISPLACED FRACTURE OF HEAD OF RIGHT RADIUS, INITIAL ENCOUNTER: Primary | ICD-10-CM

## 2024-01-23 DIAGNOSIS — R29.898 DECREASED GRIP STRENGTH OF RIGHT HAND: ICD-10-CM

## 2024-01-23 DIAGNOSIS — M25.621 DECREASED RANGE OF MOTION OF RIGHT ELBOW: ICD-10-CM

## 2024-01-23 NOTE — PROGRESS NOTES
Physical Therapy Daily Treatment Note    [facility]   [address] [facility], [address] [place] [address]      Patient: Margot Lamar   : 1975  Referring practitioner: Felicitas Bush MD  Date of Initial Visit: Type: THERAPY  Noted: 2024  Today's Date: 2024  Patient seen for 6 sessions    Certification Period 2024 thru 2024       Visit Diagnoses:    ICD-10-CM ICD-9-CM   1. Closed displaced fracture of head of right radius, initial encounter  S52.121A 813.05   2. Decreased range of motion of right elbow  M25.621 719.52   3. Decreased  strength of right hand  R29.898 729.89       Subjective     Margot Lamar presents to the physical therapy clinic today reporting slightly better symptom pattern following their previous therapy visit. They state good compliance to home interventions; continues with no questions or concerns. Other reports from patient during visit today include: a little bit of pain noted since last visit but states that she is starting to use it with more functional activity such as shutting a car door.    Objective          Active Range of Motion     Right Elbow   Extension: Right elbow active extension: +3 from full extension.       See Exercise, Manual, and Modality Logs for complete treatment.       Assessment/Plan     During physical therapy session, patient demonstrates good progress with functional activity, pain levels and progress towards therapy goals from previous visits. Progress specifically noted during today's session includes good performance of functional motions, weight bearing, functional pushing/pulling and tolerance to manual mobilizations into extension. Continues with improved fluidity of motion through arc of motion. Remaining deficits still noted during session today are pain with end-range extension/limited ROM and pain with functional motions preventing full participation. Margot Lamar will continue to benefit from  skilled physical therapy services to address deficits and continue to work towards reaching functional goals. No changes to current PT POC, patient will continue AT.       Next Visit:  Progress HEP  Continue with MT / mobilizations  Re-measure flexion   ADLs/IADLs trials (shutting car door, scrubbing)      Timed:         Manual Therapy:    8     mins  72838;     Therapeutic Exercise:    10     mins  44097;     Neuromuscular Jose:    9   mins  22546;    Therapeutic Activity:     8     mins  01495;     Gait Training:           mins  73557;     Ultrasound:          mins  93726;    Ionto                                   mins   41783  Self Care                            mins   09102  Canalith Repos         mins 68144  Electrical Stimulation:         mins  55485    Un-Timed:  Electrical Stimulation:         mins  14266 (MC );  Dry Needling          mins self-pay  Traction          mins 70459      Timed Treatment:   35   mins   Total Treatment:     35   mins    Visit and Documentation completed by Adin Velez PT,  ELIAS   [place] License Number: [id number]    Heriberto Guajardo PT, ELIAS, OCS, Cert. DN  [place] License: [id number]

## 2024-01-26 ENCOUNTER — TREATMENT (OUTPATIENT)
Dept: PHYSICAL THERAPY | Facility: CLINIC | Age: 49
End: 2024-01-26
Payer: COMMERCIAL

## 2024-01-26 DIAGNOSIS — R29.898 DECREASED GRIP STRENGTH OF RIGHT HAND: ICD-10-CM

## 2024-01-26 DIAGNOSIS — M25.621 DECREASED RANGE OF MOTION OF RIGHT ELBOW: ICD-10-CM

## 2024-01-26 DIAGNOSIS — S52.121A CLOSED DISPLACED FRACTURE OF HEAD OF RIGHT RADIUS, INITIAL ENCOUNTER: Primary | ICD-10-CM

## 2024-01-26 NOTE — PROGRESS NOTES
Physical Therapy Daily Treatment Note    [facility]   [address] [facility], [address] [place] [address]      Patient: Margot Lamar   : 1975  Referring practitioner: Felicitas Bush MD  Date of Initial Visit: Type: THERAPY  Noted: 2024  Today's Date: 2024  Patient seen for 7 sessions    Certification Period 2024 thru 2024       Visit Diagnoses:    ICD-10-CM ICD-9-CM   1. Closed displaced fracture of head of right radius, initial encounter  S52.121A 813.05   2. Decreased range of motion of right elbow  M25.621 719.52   3. Decreased  strength of right hand  R29.898 729.89       Subjective     Margot Lamar presents to the physical therapy clinic today reporting moderately better symptom pattern following their previous therapy visit. They state good compliance to home interventions; no questions or concerns. Other reports from patient during visit today include: states that she has continued to have improvement with performing ADLs/IADLs without cognitive avoidance.    Objective   See Exercise, Manual, and Modality Logs for complete treatment.       Assessment/Plan     During physical therapy session, patient demonstrates good progress with functional activity, pain levels and progress towards therapy goals from previous visits. Progress specifically noted during today's session includes able to progress twisting/pronation/supination resisted motions from previous sessions, also improved tolerance to full extension position. Remaining deficits still noted during session today are limited extension compared bilaterally. Margot Lamar will continue to benefit from skilled physical therapy services to address deficits and continue to work towards reaching functional goals. No changes to current PT POC, patient will continue AT.       Next Visit:  Update HEP  Re-measure ROM  Continue with new interventions performed today      Timed:         Manual Therapy:     10     mins  19499;     Therapeutic Exercise:    25     mins  06155;     Neuromuscular Jose:    15    mins  57493;    Therapeutic Activity:     10     mins  25245;     Gait Training:           mins  25963;     Ultrasound:          mins  66322;    Ionto                                   mins   48646  Self Care                            mins   80200  Canalith Repos         mins 63041  Electrical Stimulation:         mins  49983    Un-Timed:  Electrical Stimulation:         mins  03911 ( );  Dry Needling          mins self-pay  Traction          mins 40150      Timed Treatment:  60    mins   Total Treatment:     60   mins    Visit and Documentation completed by Adin Velez PT,  ELIAS   [place] License Number: [id number]    Heriberto Guajardo PT, ELIAS, OCS, Cert. DN  [place] License: [id number]

## 2024-01-30 ENCOUNTER — OFFICE VISIT (OUTPATIENT)
Age: 49
End: 2024-01-30
Payer: COMMERCIAL

## 2024-01-30 ENCOUNTER — TREATMENT (OUTPATIENT)
Dept: PHYSICAL THERAPY | Facility: CLINIC | Age: 49
End: 2024-01-30
Payer: COMMERCIAL

## 2024-01-30 VITALS
SYSTOLIC BLOOD PRESSURE: 110 MMHG | WEIGHT: 125 LBS | BODY MASS INDEX: 20.83 KG/M2 | HEIGHT: 65 IN | DIASTOLIC BLOOD PRESSURE: 70 MMHG

## 2024-01-30 DIAGNOSIS — M25.621 DECREASED RANGE OF MOTION OF RIGHT ELBOW: ICD-10-CM

## 2024-01-30 DIAGNOSIS — R29.898 DECREASED GRIP STRENGTH OF RIGHT HAND: ICD-10-CM

## 2024-01-30 DIAGNOSIS — S52.121A CLOSED DISPLACED FRACTURE OF HEAD OF RIGHT RADIUS, INITIAL ENCOUNTER: Primary | ICD-10-CM

## 2024-01-30 DIAGNOSIS — S52.121D CLOSED DISPLACED FRACTURE OF HEAD OF RIGHT RADIUS WITH ROUTINE HEALING, SUBSEQUENT ENCOUNTER: Primary | ICD-10-CM

## 2024-01-30 PROCEDURE — 99213 OFFICE O/P EST LOW 20 MIN: CPT | Performed by: STUDENT IN AN ORGANIZED HEALTH CARE EDUCATION/TRAINING PROGRAM

## 2024-01-30 NOTE — PROGRESS NOTES
Physical Therapy Daily Treatment Note    [facility]   [address] [facility], [address] [place] [address]      Patient: Margot Lamar   : 1975  Referring practitioner: Felicitas Bush MD  Date of Initial Visit: Type: THERAPY  Noted: 2024  Today's Date: 2024  Patient seen for 8 sessions    Certification Period 2024 thru 2024       Visit Diagnoses:    ICD-10-CM ICD-9-CM   1. Closed displaced fracture of head of right radius, initial encounter  S52.121A 813.05   2. Decreased range of motion of right elbow  M25.621 719.52   3. Decreased  strength of right hand  R29.898 729.89       Subjective     Margot Lamar presents to the physical therapy clinic today reporting slightly better symptom pattern following their previous therapy visit. They state good compliance to home interventions; no questions or concerns and states that the visit with the orthopedic physician went very well. Other reports from patient during visit today include: continues to improve her ability to perform all ADLs/IADLs with less cognitive demand.    Objective   See Exercise, Manual, and Modality Logs for complete treatment.       Assessment/Plan     During physical therapy session, patient demonstrates good progress with functional activity, pain levels and progress towards therapy goals from previous visits. Progress specifically noted during today's session includes increased tolerance to full extended position; good performance with addition of supination/pronation in position. Remaining deficits still noted during session today are limited capacity for resisted motion at end-ranges of motion of all elbow motions and continued joint stiffness. Margot Lamar will continue to benefit from skilled physical therapy services to address deficits and continue to work towards reaching functional goals. Changes made today were education on progressed HEP interventions.      Next  Visit:  Continue with MT / less time in extension  Continue with RS  UBE  Update HEP      Timed:         Manual Therapy:    15     mins  21105;     Therapeutic Exercise:    10     mins  75053;     Neuromuscular Jose:    10    mins  88045;    Therapeutic Activity:     10     mins  19477;     Gait Training:           mins  07963;     Ultrasound:          mins  42585;    Ionto                                   mins   17850  Self Care                            mins   39423  Canalith Repos         mins 64892  Electrical Stimulation:         mins  49018    Un-Timed:  Electrical Stimulation:         mins  55481 ( );  Dry Needling          mins self-pay  Traction          mins 65936      Timed Treatment:   45   mins   Total Treatment:     45   mins    Visit and Documentation completed by Adin Velez PT,  ELIAS   [place] License Number: [id number]    Heriberto Guajardo PT, ELIAS, OCS, Cert. DN  [place] License: [id number]

## 2024-01-30 NOTE — PROGRESS NOTES
[facility]opedic     Office Visit       Date: 01/30/2024   Patient Name: Margot Lamar  MRN: [id number]  YOB: 1975    Referring Physician: No ref. provider found     Chief Complaint:   Chief Complaint   Patient presents with    Follow-up     4 week follow up -- Closed displaced fracture of head of right radius     History of Present Illness:   Margot Lamar is a 48 y.o. female right-hand-dominant send to clinic for follow-up of right radial head fracture, DOI 12/21/2023.  Patient has been working with physical therapy since her last visit and has noted significant improvements in her overall pain and movement.  She denies any current pain.  She only develops pain after increased use of the right elbow such as shoveling or mopping.  There is no pain at rest.  No numbness or tingling.  No reinjury.    Subjective   Review of Systems:   Review of Systems   Constitutional:  Negative for chills, fever, unexpected weight gain and unexpected weight loss.   HENT:  Negative for congestion, postnasal drip and rhinorrhea.    Eyes:  Negative for blurred vision.   Respiratory:  Negative for shortness of breath.    Cardiovascular:  Negative for leg swelling.   Gastrointestinal:  Negative for abdominal pain, nausea and vomiting.   Genitourinary:  Negative for difficulty urinating.   Musculoskeletal:  Positive for arthralgias. Negative for gait problem, joint swelling and myalgias.   Skin:  Negative for skin lesions and wound.   Neurological:  Negative for dizziness, weakness, light-headedness and numbness.   Hematological:  Does not bruise/bleed easily.   Psychiatric/Behavioral:  Negative for depressed mood.       Pertinent review of systems per HPI    I reviewed the patient's chief complaint, history of present illness, review of systems, past medical history, surgical history, family history, social history, medications and  "allergy list in the EMR on 01/30/2024 and agree with the findings above.    Objective    Quality Measures:   ACP:   ACP discussion was declined by the patient.      Tobacco:   Margot Lamar  reports that she has never smoked. She has never used smokeless tobacco..     Vital Signs:   Vitals:    01/30/24 1030   BP: 110/70   Weight: 56.7 kg (125 lb)   Height: 165.1 cm (65\")     BMI: BMI is within normal parameters. No other follow-up for BMI required.     General: No acute distress. Alert and oriented.     Ortho Exam:  Examination of the right upper extremity demonstrates no deformity.  No skin lesions or abrasions.  She is nontender along the radiocapitellar joint, lateral epicondyle, medial epicondyle, ulnohumeral joint.  Patient has full supination and pronation.  She lacks the terminal 10 degrees of full extension and achieves 120 degrees of flexion.  She is able make composite fist.  Sensation tact light touch throughout the hand.  Warm and well-perfused distally.    Imaging / Studies:    Imaging Results (Last 24 Hours)       ** No results found for the last 24 hours. **          Assessment / Plan    Assessment/Plan:   Margot Lamar is a 48 y.o. female with right radial head fracture, DOI 12/21/2023.    The patient is doing well clinically and has had significant improvements in her overall pain and range of motion.  I think she will continue to benefit from  physical therapy regarding her range of motion.  I would however like to see her back in 6 weeks for reevaluation of her motion and with repeat x-rays.  The patient was agreeable to plan.  All questions and concerns were addressed.      ICD-10-CM ICD-9-CM   1. Closed displaced fracture of head of right radius with routine healing, subsequent encounter  S52.121D V54.12     Follow Up:   Return in about 6 weeks (around 3/12/2024) for Follow Up- with repeat xrays.      Felicitas Bush MD  [facility]  "

## 2024-02-02 ENCOUNTER — TREATMENT (OUTPATIENT)
Dept: PHYSICAL THERAPY | Facility: CLINIC | Age: 49
End: 2024-02-02
Payer: COMMERCIAL

## 2024-02-02 DIAGNOSIS — R29.898 DECREASED GRIP STRENGTH OF RIGHT HAND: ICD-10-CM

## 2024-02-02 DIAGNOSIS — S52.121A CLOSED DISPLACED FRACTURE OF HEAD OF RIGHT RADIUS, INITIAL ENCOUNTER: Primary | ICD-10-CM

## 2024-02-02 DIAGNOSIS — M25.621 DECREASED RANGE OF MOTION OF RIGHT ELBOW: ICD-10-CM

## 2024-02-07 ENCOUNTER — TREATMENT (OUTPATIENT)
Dept: PHYSICAL THERAPY | Facility: CLINIC | Age: 49
End: 2024-02-07
Payer: COMMERCIAL

## 2024-02-07 DIAGNOSIS — R29.898 DECREASED GRIP STRENGTH OF RIGHT HAND: ICD-10-CM

## 2024-02-07 DIAGNOSIS — S52.121A CLOSED DISPLACED FRACTURE OF HEAD OF RIGHT RADIUS, INITIAL ENCOUNTER: Primary | ICD-10-CM

## 2024-02-07 DIAGNOSIS — M25.621 DECREASED RANGE OF MOTION OF RIGHT ELBOW: ICD-10-CM

## 2024-02-07 NOTE — PROGRESS NOTES
Physical Therapy Daily Treatment Note    [facility]   [address] [facility], [address] [place] [address]      Patient: Margot Lamar   : 1975  Referring practitioner: Felicitas Bush MD  Date of Initial Visit: Type: THERAPY  Noted: 2024  Today's Date: 2024  Patient seen for 10 sessions    Certification Period 2024 thru 2024       Visit Diagnoses:    ICD-10-CM ICD-9-CM   1. Closed displaced fracture of head of right radius, initial encounter  S52.121A 813.05   2. Decreased range of motion of right elbow  M25.621 719.52   3. Decreased  strength of right hand  R29.898 729.89       Subjective     Margot Lamar presents to the physical therapy clinic today reporting slightly worse symptom pattern following their previous therapy visit. They state fair compliance to home interventions; states that she was having more difficulty with interventions, specifically shoulder extension from anchored resistance and limited participation due to increased pain. Other reports from patient during visit today include: reports that she was having increased elbow pain which was about 2/10 that kept her from sleeping. She also states that she slipped while going down stairs and that she used her right arm to catch herself which caused increased pain that lasted for about 5 minutes then went away. States that pain is not any more than previously noted since last session.    Objective   See Exercise, Manual, and Modality Logs for complete treatment.       Assessment/Plan     During physical therapy session, patient demonstrates good progress with functional activity, pain levels and progress towards therapy goals from previous visits. Progress specifically noted during today's session includes improvement in tone in biceps muscle group on involved limb at proximal segment and continued improvement with resisted supination/pronation. Remaining deficits still noted during session today are  increased tone in biceps muscle group limiting volitional elbow extension, mild joint stiffness and global UE weakness. Margot Lamar will continue to benefit from skilled physical therapy services to address deficits and continue to work towards reaching functional goals. Changes made today were plan to continue with MT on musculature proximal to joint to facilitate further ROM gains.      Next Visit:  MT  Continue with pushing/pulling        Timed:         Manual Therapy:    10     mins  30572;     Therapeutic Exercise:    25     mins  09763;     Neuromuscular Jose:        mins  16353;    Therapeutic Activity:     15     mins  26298;     Gait Training:           mins  41393;     Ultrasound:          mins  37224;    Ionto                                   mins   67380  Self Care                            mins   30096  Canalith Repos        mins 26036  Electrical Stimulation:         mins  52810    Un-Timed:  Electrical Stimulation:         mins  15601 ( );  Dry Needling          mins self-pay  Traction          mins 12616      Timed Treatment:   50   mins   Total Treatment:     50   mins    Visit and Documentation completed by Adin Velez PT, DPT   [place] License Number: [id number]    Herbierto Guajardo PT, DPT, OCS, Cert. DN  [place] License: [id number]

## 2024-02-14 ENCOUNTER — TREATMENT (OUTPATIENT)
Dept: PHYSICAL THERAPY | Facility: CLINIC | Age: 49
End: 2024-02-14
Payer: COMMERCIAL

## 2024-02-14 DIAGNOSIS — R29.898 DECREASED GRIP STRENGTH OF RIGHT HAND: ICD-10-CM

## 2024-02-14 DIAGNOSIS — M25.621 DECREASED RANGE OF MOTION OF RIGHT ELBOW: ICD-10-CM

## 2024-02-14 DIAGNOSIS — S52.121A CLOSED DISPLACED FRACTURE OF HEAD OF RIGHT RADIUS, INITIAL ENCOUNTER: Primary | ICD-10-CM

## 2024-02-21 ENCOUNTER — TREATMENT (OUTPATIENT)
Dept: PHYSICAL THERAPY | Facility: CLINIC | Age: 49
End: 2024-02-21
Payer: COMMERCIAL

## 2024-02-21 DIAGNOSIS — R29.898 DECREASED GRIP STRENGTH OF RIGHT HAND: ICD-10-CM

## 2024-02-21 DIAGNOSIS — M25.621 DECREASED RANGE OF MOTION OF RIGHT ELBOW: ICD-10-CM

## 2024-02-21 DIAGNOSIS — S52.121A CLOSED DISPLACED FRACTURE OF HEAD OF RIGHT RADIUS, INITIAL ENCOUNTER: Primary | ICD-10-CM

## 2024-02-21 NOTE — PROGRESS NOTES
Physical Therapy Daily Treatment Note    [facility]   [address], [address] [place] [address]      Patient: Margot Lamar   : 1975  Referring practitioner: Felicitas Bush MD  Date of Initial Visit: Type: THERAPY  Noted: 2024  Today's Date: 2024  Patient seen for 12 sessions    Certification Period 2024 thru 2024       Visit Diagnoses:    ICD-10-CM ICD-9-CM   1. Closed displaced fracture of head of right radius, initial encounter  S52.121A 813.05   2. Decreased range of motion of right elbow  M25.621 719.52   3. Decreased  strength of right hand  R29.898 729.89       Subjective     Margot Lamar presents to the physical therapy clinic today reporting slightly better symptom pattern following their previous therapy visit. They state good compliance to home interventions; states that she is continue to perform all interventions without questions or concerns.. Other reports from patient during visit today include: She notes that she had a breakthrough which she feels that she is able to perform more activation of her triceps musculature with functional activities.    Objective   See Exercise, Manual, and Modality Logs for complete treatment.       Assessment/Plan     During physical therapy session, patient demonstrates good progress with functional activity, pain levels and progress towards therapy goals from previous visits. Progress specifically noted during today's session includes improvement with triceps activation within session and performing during interventions at home. Remaining deficits still noted during session today are continued limitations and elbow extension range of motion active and passively, deficits with tricep activation and pain with resisted supination/pronation. Margot Lamar will continue to benefit from skilled physical therapy services to address deficits and continue to work towards reaching functional goals.  No  changes to current patient plan of care.      Next Visit:  Continue with resisted motions  Gentle extension mobilizations  Continue with functional mobility  Retest  strength and manual muscle testing as appropriate      Timed:         Manual Therapy:    15     mins  96204;     Therapeutic Exercise:    12     mins  56638;     Neuromuscular Jose:    8    mins  32015;    Therapeutic Activity:     8     mins  87537;     Gait Training:           mins  32507;     Ultrasound:          mins  63363;    Ionto                                   mins   64896  Self Care                            mins   54220  Canalith Repos        mins 65930  Electrical Stimulation:         mins  42222    Un-Timed:  Electrical Stimulation:         mins  73722 ( );  Dry Needling          mins self-pay  Traction          mins 38025      Timed Treatment:   43   mins   Total Treatment:     43   mins    Visit and Documentation completed by Adin Velez PT, DPT   [place] License Number: [id number]    Heriberto Guajardo PT, DPT, OCS, Cert. DN  [place] License: [id number]

## 2024-02-28 ENCOUNTER — TREATMENT (OUTPATIENT)
Dept: PHYSICAL THERAPY | Facility: CLINIC | Age: 49
End: 2024-02-28
Payer: COMMERCIAL

## 2024-02-28 DIAGNOSIS — S52.121A CLOSED DISPLACED FRACTURE OF HEAD OF RIGHT RADIUS, INITIAL ENCOUNTER: Primary | ICD-10-CM

## 2024-02-28 DIAGNOSIS — R29.898 DECREASED GRIP STRENGTH OF RIGHT HAND: ICD-10-CM

## 2024-02-28 DIAGNOSIS — M25.621 DECREASED RANGE OF MOTION OF RIGHT ELBOW: ICD-10-CM

## 2024-02-28 NOTE — PROGRESS NOTES
Physical Therapy Daily Treatment Note    [facility]   [address], [address] [place] [address]      Patient: Margot Lamar   : 1975  Referring practitioner: Felicitas Bush MD  Date of Initial Visit: Type: THERAPY  Noted: 2024  Today's Date: 2024  Patient seen for 13 sessions    Certification Period 2024 thru 2024       Visit Diagnoses:    ICD-10-CM ICD-9-CM   1. Closed displaced fracture of head of right radius, initial encounter  S52.121A 813.05   2. Decreased range of motion of right elbow  M25.621 719.52   3. Decreased  strength of right hand  R29.898 729.89       Subjective     Margot Lamar presents to the physical therapy clinic today reporting slightly better symptom pattern following their previous therapy visit. They state good compliance to home interventions; no questions or concerns. Other reports from patient during visit today include: continued performance of ADLs/IADLs, specifically scrubbing stovetop with increased tolerance.    Objective          Active Range of Motion     Right Elbow   Flexion: 127 degrees   Extension: with pain  Forearm supination: WFL  Forearm pronation: WFL    Additional Active Range of Motion Details  -15 extension on involved limb    Strength/Myotome Testing     Right Elbow   Flexion: 4+  Extension: 4-  Forearm supination: 4  Forearm pronation: 4      See Exercise, Manual, and Modality Logs for complete treatment.     Goals  Plan Goals: Short Term Goals: 3 weeks  1. Pt to demonstrate independence with HEP - Met  2. Pt to demonstrate full ROM of the right elbow and shoulder into flexion and extension  3. Pt to demonstrate ability to perform 30 min continuous activity in the clinic without exacerbation of symptoms - Met    Long Term Goals: 6 weeks  1. Pt to demonstrate 4+/5 strength of the elbow in flexion and extension   2. Pt to report being able to use the right arm for all ADL's without exacerbation of  symptoms - Met  3. Pt to demonstrate ability to perform 60 minutes continuous activity in the clinic without exacerbation of symptoms - Met  4. Pt to improve by MCID of score on DASH to demonstrate decreased disability and enhanced QOL - Met      Assessment/Plan     During physical therapy session, patient demonstrates good progress with functional activity, pain levels and progress towards therapy goals from previous visits. Progress specifically noted during today's session includes improved tolerance to resisted motion and IND with all home interventions. Remaining deficits still noted during session today are still limitations noted in AROM and limited strength of triceps throughout arc of motion. Margot Lamar states that she is satisfied with current functional progress and states not continuing limitations in functional/recreational tasks and feels that she would like to terminate current physical therapy services. Education provided on remaining deficits and continued benefits of physical therapy. Patient verbalized understanding and states that she is satisfied with continuing with home interventions. Patient will be discharged today from further therapy services and is welcome to return as needed for further issues or treatment.     Discharge date: 2/28/24      Timed:         Manual Therapy:    15     mins  54940;     Therapeutic Exercise:    8     mins  69228;     Neuromuscular Jose:        mins  85824;    Therapeutic Activity:   15       mins  81224;     Gait Training:           mins  71013;     Ultrasound:          mins  19007;    Ionto                                   mins   28440  Self Care                            mins   80382  Canalith Repos         mins 18782  Electrical Stimulation:         mins  54800    Un-Timed:  Electrical Stimulation:         mins  14469 ( );  Dry Needling          mins self-pay  Traction          mins 04302      Timed Treatment:   38   mins   Total  Treatment:     38   mins    Visit and Documentation completed by Adin Velez PT,  ELIAS   [place] License Number: [id number]    Heriberto Guajardo PT, KATLINT, OCS, Cert. DN  [place] License: [id number]

## 2024-03-08 DIAGNOSIS — S52.121D CLOSED DISPLACED FRACTURE OF HEAD OF RIGHT RADIUS WITH ROUTINE HEALING, SUBSEQUENT ENCOUNTER: Primary | ICD-10-CM

## 2024-03-12 ENCOUNTER — OFFICE VISIT (OUTPATIENT)
Age: 49
End: 2024-03-12
Payer: COMMERCIAL

## 2024-03-12 ENCOUNTER — HOSPITAL ENCOUNTER (OUTPATIENT)
Dept: GENERAL RADIOLOGY | Facility: HOSPITAL | Age: 49
Discharge: HOME | End: 2024-03-12
Admitting: STUDENT IN AN ORGANIZED HEALTH CARE EDUCATION/TRAINING PROGRAM
Payer: COMMERCIAL

## 2024-03-12 VITALS
SYSTOLIC BLOOD PRESSURE: 126 MMHG | HEIGHT: 65 IN | BODY MASS INDEX: 21.16 KG/M2 | WEIGHT: 127 LBS | DIASTOLIC BLOOD PRESSURE: 82 MMHG

## 2024-03-12 DIAGNOSIS — S52.121D CLOSED DISPLACED FRACTURE OF HEAD OF RIGHT RADIUS WITH ROUTINE HEALING, SUBSEQUENT ENCOUNTER: Primary | ICD-10-CM

## 2024-03-12 DIAGNOSIS — S52.121D CLOSED DISPLACED FRACTURE OF HEAD OF RIGHT RADIUS WITH ROUTINE HEALING, SUBSEQUENT ENCOUNTER: ICD-10-CM

## 2024-03-12 PROCEDURE — 73080 X-RAY EXAM OF ELBOW: CPT

## 2024-03-12 PROCEDURE — 99213 OFFICE O/P EST LOW 20 MIN: CPT | Performed by: STUDENT IN AN ORGANIZED HEALTH CARE EDUCATION/TRAINING PROGRAM

## 2024-03-12 NOTE — PROGRESS NOTES
[facility]     Office Visit       Date: 03/12/2024   Patient Name: Margot Lamar  MRN: [id number]  YOB: 1975    Referring Physician: No ref. provider found     Chief Complaint:   Chief Complaint   Patient presents with    Follow-up     6 week follow up -- Closed displaced fracture of head of right radius     History of Present Illness:   Margot Lamar is a 48 y.o. female right-hand-dominant presenting to clinic for follow-up of right radial head fracture, DOI 12/21/2023.  Patient is been doing well since her last clinic visit.  She continues to progress with physical therapy and has been transition into a home exercise program.  She reports pain with range of motion.  She has been able to return to all of her activities of daily living without restriction.  No reinjury.  No other complaints or concerns.    Subjective   Review of Systems:   Review of Systems   Constitutional:  Negative for chills, fever, unexpected weight gain and unexpected weight loss.   HENT:  Negative for congestion, postnasal drip and rhinorrhea.    Eyes:  Negative for blurred vision.   Respiratory:  Negative for shortness of breath.    Cardiovascular:  Negative for leg swelling.   Gastrointestinal:  Negative for abdominal pain, nausea and vomiting.   Genitourinary:  Negative for difficulty urinating.   Musculoskeletal:  Positive for arthralgias. Negative for gait problem, joint swelling and myalgias.   Skin:  Negative for skin lesions and wound.   Neurological:  Negative for dizziness, weakness, light-headedness and numbness.   Hematological:  Does not bruise/bleed easily.   Psychiatric/Behavioral:  Negative for depressed mood.       Pertinent review of systems per HPI    I reviewed the patient's chief complaint, history of present illness, review of systems, past medical history, surgical history, family history, social history,  "medications and allergy list in the EMR on 03/12/2024 and agree with the findings above.    Objective    Quality Measures:   ACP:   ACP discussion was declined by the patient.      Tobacco:   Margot Lamar  reports that she has never smoked. She has never used smokeless tobacco.     Vital Signs:   Vitals:    03/12/24 1109   BP: 126/82   Weight: 57.6 kg (127 lb)   Height: 165.1 cm (65\")     BMI: BMI is within normal parameters. No other follow-up for BMI required.     General: No acute distress. Alert and oriented.     Ortho Exam:  Examination of the right upper extremity demonstrates no deformity.  There are no skin lesions or abrasions.  No swelling or ecchymosis.  She is nontender along the radiocapitellar joint and there is no catching or locking noted.  Nontender along the lateral and medial epicondyles.  She achieves 115 degrees of elbow flexion and lacks the terminal 5 degrees of full extension.  She has full pronation and supination that is painless.  Sensation tact light touch at the hand.  Warm and well-perfused distally.    Imaging / Studies:    Imaging Results (Last 24 Hours)       ** No results found for the last 24 hours. **        Right elbow x-rays obtained on 3/12/2024 were personally reviewed and interpreted by myself.  These demonstrate normal healing of radial head fracture.    Assessment / Plan    Assessment/Plan:   Margot Lamar is a 48 y.o. female with right radial head fracture, DOI 12/21/2023.    The patient has done well with nonoperative treatment for her right radial head fracture.  She has progressed with physical therapy and has returned to all of her activities day living without restrictions in range of motion or pain.  She does lack the terminal 5 degrees of extension, however she does have functional range of motion.  Therefore I will see the patient back only as needed.  She was agreeable to the plan.  All question concerns were addressed.      ICD-10-CM ICD-9-CM "   1. Closed displaced fracture of head of right radius with routine healing, subsequent encounter  S52.121D V54.12     Follow Up:   Return if symptoms worsen or fail to improve.      Felicitas Bush MD  [facility]

## 2024-05-13 ENCOUNTER — TRANSCRIBE ORDERS (OUTPATIENT)
Dept: ADMINISTRATIVE | Facility: HOSPITAL | Age: 49
End: 2024-05-13
Payer: COMMERCIAL

## 2024-05-13 DIAGNOSIS — Z12.31 VISIT FOR SCREENING MAMMOGRAM: Primary | ICD-10-CM

## 2024-06-13 ENCOUNTER — HOSPITAL ENCOUNTER (OUTPATIENT)
Dept: MAMMOGRAPHY | Facility: HOSPITAL | Age: 49
Discharge: HOME | End: 2024-06-13
Admitting: INTERNAL MEDICINE
Payer: COMMERCIAL

## 2024-06-13 DIAGNOSIS — Z12.31 VISIT FOR SCREENING MAMMOGRAM: ICD-10-CM

## 2024-06-13 PROCEDURE — 77063 BREAST TOMOSYNTHESIS BI: CPT

## 2024-06-13 PROCEDURE — 77067 SCR MAMMO BI INCL CAD: CPT

## 2024-10-22 DIAGNOSIS — Z30.41 ENCOUNTER FOR BIRTH CONTROL PILLS MAINTENANCE: ICD-10-CM

## 2024-10-22 RX ORDER — NORGESTIMATE AND ETHINYL ESTRADIOL 0.25-0.035
1 KIT ORAL DAILY
Qty: 28 TABLET | Refills: 0 | Status: SHIPPED | OUTPATIENT
Start: 2024-10-22

## 2024-11-18 ENCOUNTER — OFFICE VISIT (OUTPATIENT)
Dept: INTERNAL MEDICINE | Facility: CLINIC | Age: 49
End: 2024-11-18
Payer: COMMERCIAL

## 2024-11-18 ENCOUNTER — LAB (OUTPATIENT)
Dept: LAB | Facility: HOSPITAL | Age: 49
End: 2024-11-18
Payer: COMMERCIAL

## 2024-11-18 VITALS
HEIGHT: 65 IN | BODY MASS INDEX: 21.49 KG/M2 | TEMPERATURE: 96.5 F | WEIGHT: 129 LBS | HEART RATE: 73 BPM | DIASTOLIC BLOOD PRESSURE: 84 MMHG | SYSTOLIC BLOOD PRESSURE: 112 MMHG

## 2024-11-18 DIAGNOSIS — E55.9 VITAMIN D DEFICIENCY: ICD-10-CM

## 2024-11-18 DIAGNOSIS — E78.00 HYPERCHOLESTEROLEMIA: ICD-10-CM

## 2024-11-18 DIAGNOSIS — Z00.00 ANNUAL PHYSICAL EXAM: ICD-10-CM

## 2024-11-18 DIAGNOSIS — Z23 NEED FOR INFLUENZA VACCINATION: ICD-10-CM

## 2024-11-18 DIAGNOSIS — Z00.00 ANNUAL PHYSICAL EXAM: Primary | ICD-10-CM

## 2024-11-18 DIAGNOSIS — L82.1 SEBORRHEIC KERATOSES: ICD-10-CM

## 2024-11-18 DIAGNOSIS — Z30.41 ENCOUNTER FOR BIRTH CONTROL PILLS MAINTENANCE: ICD-10-CM

## 2024-11-18 LAB
25(OH)D3 SERPL-MCNC: 35.7 NG/ML (ref 30–100)
ALBUMIN SERPL-MCNC: 4.1 G/DL (ref 3.5–5.2)
ALBUMIN/GLOB SERPL: 1.2 G/DL
ALP SERPL-CCNC: 45 U/L (ref 39–117)
ALT SERPL W P-5'-P-CCNC: 14 U/L (ref 1–33)
ANION GAP SERPL CALCULATED.3IONS-SCNC: 11.1 MMOL/L (ref 5–15)
AST SERPL-CCNC: 16 U/L (ref 1–32)
BILIRUB SERPL-MCNC: 0.4 MG/DL (ref 0–1.2)
BUN SERPL-MCNC: 12 MG/DL (ref 6–20)
BUN/CREAT SERPL: 12.6 (ref 7–25)
CALCIUM SPEC-SCNC: 9.1 MG/DL (ref 8.6–10.5)
CHLORIDE SERPL-SCNC: 101 MMOL/L (ref 98–107)
CHOLEST SERPL-MCNC: 226 MG/DL (ref 0–200)
CO2 SERPL-SCNC: 23.9 MMOL/L (ref 22–29)
CREAT SERPL-MCNC: 0.95 MG/DL (ref 0.57–1)
DEPRECATED RDW RBC AUTO: 39.4 FL (ref 37–54)
EGFRCR SERPLBLD CKD-EPI 2021: 73.6 ML/MIN/1.73
ERYTHROCYTE [DISTWIDTH] IN BLOOD BY AUTOMATED COUNT: 11.7 % (ref 12.3–15.4)
GLOBULIN UR ELPH-MCNC: 3.3 GM/DL
GLUCOSE SERPL-MCNC: 90 MG/DL (ref 65–99)
HBA1C MFR BLD: 5.5 % (ref 4.8–5.6)
HCT VFR BLD AUTO: 42.5 % (ref 34–46.6)
HDLC SERPL-MCNC: 67 MG/DL (ref 40–60)
HGB BLD-MCNC: 14.5 G/DL (ref 12–15.9)
LDLC SERPL CALC-MCNC: 135 MG/DL (ref 0–100)
LDLC/HDLC SERPL: 1.97 {RATIO}
MCH RBC QN AUTO: 31.5 PG (ref 26.6–33)
MCHC RBC AUTO-ENTMCNC: 34.1 G/DL (ref 31.5–35.7)
MCV RBC AUTO: 92.2 FL (ref 79–97)
PLATELET # BLD AUTO: 332 10*3/MM3 (ref 140–450)
PMV BLD AUTO: 9.8 FL (ref 6–12)
POTASSIUM SERPL-SCNC: 4.9 MMOL/L (ref 3.5–5.2)
PROT SERPL-MCNC: 7.4 G/DL (ref 6–8.5)
RBC # BLD AUTO: 4.61 10*6/MM3 (ref 3.77–5.28)
SODIUM SERPL-SCNC: 136 MMOL/L (ref 136–145)
TRIGL SERPL-MCNC: 134 MG/DL (ref 0–150)
TSH SERPL DL<=0.05 MIU/L-ACNC: 1.56 UIU/ML (ref 0.27–4.2)
VLDLC SERPL-MCNC: 24 MG/DL (ref 5–40)
WBC NRBC COR # BLD AUTO: 8.6 10*3/MM3 (ref 3.4–10.8)

## 2024-11-18 PROCEDURE — 90471 IMMUNIZATION ADMIN: CPT | Performed by: INTERNAL MEDICINE

## 2024-11-18 PROCEDURE — 80061 LIPID PANEL: CPT

## 2024-11-18 PROCEDURE — 80053 COMPREHEN METABOLIC PANEL: CPT

## 2024-11-18 PROCEDURE — 82306 VITAMIN D 25 HYDROXY: CPT

## 2024-11-18 PROCEDURE — 99396 PREV VISIT EST AGE 40-64: CPT | Performed by: INTERNAL MEDICINE

## 2024-11-18 PROCEDURE — 85027 COMPLETE CBC AUTOMATED: CPT

## 2024-11-18 PROCEDURE — 84443 ASSAY THYROID STIM HORMONE: CPT

## 2024-11-18 PROCEDURE — 90656 IIV3 VACC NO PRSV 0.5 ML IM: CPT | Performed by: INTERNAL MEDICINE

## 2024-11-18 PROCEDURE — 83036 HEMOGLOBIN GLYCOSYLATED A1C: CPT

## 2024-11-18 RX ORDER — NORGESTIMATE AND ETHINYL ESTRADIOL 0.25-0.035
1 KIT ORAL DAILY
Qty: 28 TABLET | Refills: 12 | Status: SHIPPED | OUTPATIENT
Start: 2024-11-18

## 2024-11-18 NOTE — PROGRESS NOTES
Internal Medicine Annual Exam  Margot Lamar is a 49 y.o. female who presents today for an annual exam and with concerns as outlined below.    Chief Complaint  Chief Complaint   Patient presents with    Annual Exam    Suspicious Skin Lesion     Back under bra strap, It could have been there for a while but pt just noticed recently.    Med Refill     Birth control         HPI  Ms. Lamar comes in today for her physical. She remains generally healthy. She is on OCP for contraception which she wishes to continue. She has had regular menstrual cycles but they are becoming much lighter. She continues to be active, runs 3 times a week. Diet is healthy. Notes cravings for unhealthy foods lessen when she runs. She is up to date with mammogram, GYN exam, cologuard. She would like a flu shot. She declines COVID vaccine due to side effects. Denies use of tobacco, ecigarettes, illicit drugs, and drinks minimal alcohol. She notes a rough skin lesion on R side under bra. Has been present for some time and not changing.         Review of Systems  Review of Systems   Skin:  Positive for skin lesions.   All other systems reviewed and are negative.       Past Medical History  History reviewed. No pertinent past medical history.     Surgical History  History reviewed. No pertinent surgical history.     Family History  Family History   Problem Relation Age of Onset    Parkinsonism Mother         Progressive supranuclear palsy    Hearing loss Mother         50% hearing loss    Stroke Father     Diabetes Half-Sister     Arrhythmia Brother         benign, since teenage years    Heart disease Maternal Grandfather     Heart attack Maternal Grandfather     Early death Maternal Grandfather     No Known Problems Sister     Breast cancer Neg Hx     Ovarian cancer Neg Hx         Social History  Social History     Socioeconomic History    Marital status:    Tobacco Use    Smoking status: Never    Smokeless tobacco: Never  "  Vaping Use    Vaping status: Never Used   Substance and Sexual Activity    Alcohol use: Yes     Alcohol/week: 1.0 standard drink of alcohol     Types: 1 Glasses of wine per week     Comment: 2 drinks per month    Drug use: No    Sexual activity: Yes     Partners: Male     Birth control/protection: Pill     Comment: Only with         Current Medications  Current Outpatient Medications on File Prior to Visit   Medication Sig Dispense Refill    norgestimate-ethinyl estradiol (Sprintec 28) 0.25-35 MG-MCG per tablet Take 1 tablet by mouth Daily. 28 tablet 0     No current facility-administered medications on file prior to visit.       Allergies  Allergies   Allergen Reactions    Penicillins Angioedema        Objective  Visit Vitals  /84   Pulse 73   Temp 96.5 °F (35.8 °C) (Temporal)   Ht 165.1 cm (65\")   Wt 58.5 kg (129 lb)   LMP 11/01/2024 (Exact Date)   PF 99 L/min   BMI 21.47 kg/m²        Physical Exam  Physical Exam  Vitals and nursing note reviewed.   Constitutional:       General: She is not in acute distress.     Appearance: Normal appearance. She is well-developed and normal weight. She is not ill-appearing, toxic-appearing or diaphoretic.   HENT:      Head: Normocephalic and atraumatic.      Right Ear: Tympanic membrane, ear canal and external ear normal.      Left Ear: Tympanic membrane, ear canal and external ear normal.      Nose: Nose normal.   Eyes:      General: No scleral icterus.     Conjunctiva/sclera: Conjunctivae normal.   Neck:      Thyroid: No thyromegaly.   Cardiovascular:      Rate and Rhythm: Normal rate and regular rhythm.      Heart sounds: Normal heart sounds. No murmur heard.  Pulmonary:      Effort: Pulmonary effort is normal. No respiratory distress.      Breath sounds: Normal breath sounds.   Abdominal:      General: There is no distension.      Palpations: Abdomen is soft. There is no mass.      Tenderness: There is no abdominal tenderness.   Musculoskeletal:         " General: No deformity.      Cervical back: Neck supple.      Right lower leg: No edema.      Left lower leg: No edema.   Lymphadenopathy:      Cervical: No cervical adenopathy.   Skin:     General: Skin is warm and dry.      Findings: Lesion (raised rough hyperpigmented lesion R side) present. No rash.   Neurological:      Mental Status: She is alert and oriented to person, place, and time. Mental status is at baseline.      Gait: Gait normal.   Psychiatric:         Mood and Affect: Mood normal.         Behavior: Behavior normal.         Thought Content: Thought content normal.         Judgment: Judgment normal.          Results  Results for orders placed or performed in visit on 11/14/23   Cologuard - Stool, Per Rectum    Collection Time: 11/26/23  2:40 PM    Specimen: Per Rectum; Stool   Result Value Ref Range    Cologuard Negative Negative        Assessment and Plan  Diagnoses and all orders for this visit:    Annual physical exam  - Counseling was given to patient for the following topics:  appropriate exercise, healthy eating habits, disease prevention, importance of self breast exam and breast health, and importance of immunizations, including risks and benefits. Also discussed the importance of regular dental and vision care.  -     CBC (No Diff); Future  -     Comprehensive Metabolic Panel; Future  -     TSH Rfx On Abnormal To Free T4; Future  -     Hemoglobin A1c; Future    Hypercholesterolemia  - has been improving with active lifestyle and healthy diet  - will update lipid panel    Vitamin D deficiency  - will update level    Seborrheic keratoses  - reassurance provided    Need for influenza vaccination  -     Fluzone >6mos    Encounter for birth control pills maintenance  - continue sprintec, remains appropriate for OCP        Health Maintenance   Topic Date Due    COVID-19 Vaccine (6 - 2024-25 season) 09/01/2024    ANNUAL PHYSICAL  11/14/2024    LIPID PANEL  11/14/2024    MAMMOGRAM  06/13/2026     COLORECTAL CANCER SCREENING  11/26/2026    PAP SMEAR  11/14/2028    TDAP/TD VACCINES (3 - Td or Tdap) 10/20/2030    HEPATITIS C SCREENING  Completed    INFLUENZA VACCINE  Completed    Pneumococcal Vaccine 0-64  Aged Out       Health Maintenance  - Pap smear: 11/2023 negative cytology and cotest, repeat 3-5y  - Mammogram: 4/2023 BIRADS 1  - Colonoscopy: cologuard negative 11/2023  - HCV: negative  - Immunizations: flu UTD. COVID declined. Tdap 10/2020.  - Depression screening: negative 11/2024    Return in about 1 year (around 11/18/2025) for Annual, Labs today.

## 2025-07-16 ENCOUNTER — TRANSCRIBE ORDERS (OUTPATIENT)
Dept: ADMINISTRATIVE | Facility: HOSPITAL | Age: 50
End: 2025-07-16

## 2025-07-16 DIAGNOSIS — Z12.31 SCREENING MAMMOGRAM FOR BREAST CANCER: Primary | ICD-10-CM
